# Patient Record
Sex: FEMALE | Race: WHITE | NOT HISPANIC OR LATINO | Employment: STUDENT | URBAN - METROPOLITAN AREA
[De-identification: names, ages, dates, MRNs, and addresses within clinical notes are randomized per-mention and may not be internally consistent; named-entity substitution may affect disease eponyms.]

---

## 2017-01-05 ENCOUNTER — OFFICE VISIT (OUTPATIENT)
Dept: OBSTETRICS AND GYNECOLOGY | Facility: CLINIC | Age: 27
End: 2017-01-05
Payer: MEDICAID

## 2017-01-05 ENCOUNTER — LAB VISIT (OUTPATIENT)
Dept: LAB | Facility: HOSPITAL | Age: 27
End: 2017-01-05
Attending: NURSE PRACTITIONER
Payer: MEDICAID

## 2017-01-05 VITALS
HEIGHT: 70 IN | WEIGHT: 167.13 LBS | SYSTOLIC BLOOD PRESSURE: 122 MMHG | BODY MASS INDEX: 23.93 KG/M2 | DIASTOLIC BLOOD PRESSURE: 76 MMHG

## 2017-01-05 DIAGNOSIS — Z34.91 ENCOUNTER FOR SUPERVISION OF NORMAL PREGNANCY IN FIRST TRIMESTER, UNSPECIFIED GRAVIDITY: ICD-10-CM

## 2017-01-05 DIAGNOSIS — Z34.91 ENCOUNTER FOR SUPERVISION OF NORMAL PREGNANCY IN FIRST TRIMESTER, UNSPECIFIED GRAVIDITY: Primary | ICD-10-CM

## 2017-01-05 LAB
ABO + RH BLD: NORMAL
BACTERIA #/AREA URNS AUTO: NORMAL /HPF
BASOPHILS # BLD AUTO: 0.02 K/UL
BASOPHILS NFR BLD: 0.3 %
BILIRUB UR QL STRIP: NEGATIVE
BLD GP AB SCN CELLS X3 SERPL QL: NORMAL
CAOX CRY UR QL COMP ASSIST: NORMAL
CLARITY UR REFRACT.AUTO: ABNORMAL
COLOR UR AUTO: YELLOW
DIFFERENTIAL METHOD: ABNORMAL
EOSINOPHIL # BLD AUTO: 0.1 K/UL
EOSINOPHIL NFR BLD: 1.9 %
ERYTHROCYTE [DISTWIDTH] IN BLOOD BY AUTOMATED COUNT: 12.6 %
GLUCOSE SERPL-MCNC: 67 MG/DL
GLUCOSE UR QL STRIP: NEGATIVE
HCT VFR BLD AUTO: 36.6 %
HGB BLD-MCNC: 13.1 G/DL
HGB UR QL STRIP: NEGATIVE
KETONES UR QL STRIP: NEGATIVE
LEUKOCYTE ESTERASE UR QL STRIP: NEGATIVE
LYMPHOCYTES # BLD AUTO: 1.8 K/UL
LYMPHOCYTES NFR BLD: 24.6 %
MCH RBC QN AUTO: 31.5 PG
MCHC RBC AUTO-ENTMCNC: 35.8 %
MCV RBC AUTO: 88 FL
MICROSCOPIC COMMENT: NORMAL
MONOCYTES # BLD AUTO: 0.5 K/UL
MONOCYTES NFR BLD: 6.3 %
NEUTROPHILS # BLD AUTO: 4.9 K/UL
NEUTROPHILS NFR BLD: 66.8 %
NITRITE UR QL STRIP: NEGATIVE
PH UR STRIP: 6 [PH] (ref 5–8)
PLATELET # BLD AUTO: 205 K/UL
PMV BLD AUTO: 11.3 FL
PROT UR QL STRIP: NEGATIVE
RBC # BLD AUTO: 4.16 M/UL
RBC #/AREA URNS AUTO: 1 /HPF (ref 0–4)
SP GR UR STRIP: 1.02 (ref 1–1.03)
SQUAMOUS #/AREA URNS AUTO: 19 /HPF
URN SPEC COLLECT METH UR: ABNORMAL
UROBILINOGEN UR STRIP-ACNC: NEGATIVE EU/DL
WBC # BLD AUTO: 7.32 K/UL
WBC #/AREA URNS AUTO: 2 /HPF (ref 0–5)

## 2017-01-05 PROCEDURE — 86703 HIV-1/HIV-2 1 RESULT ANTBDY: CPT

## 2017-01-05 PROCEDURE — 86850 RBC ANTIBODY SCREEN: CPT

## 2017-01-05 PROCEDURE — 88175 CYTOPATH C/V AUTO FLUID REDO: CPT

## 2017-01-05 PROCEDURE — 86900 BLOOD TYPING SEROLOGIC ABO: CPT

## 2017-01-05 PROCEDURE — 99999 PR PBB SHADOW E&M-EST. PATIENT-LVL III: CPT | Mod: PBBFAC,,, | Performed by: NURSE PRACTITIONER

## 2017-01-05 PROCEDURE — 86592 SYPHILIS TEST NON-TREP QUAL: CPT

## 2017-01-05 PROCEDURE — 82947 ASSAY GLUCOSE BLOOD QUANT: CPT

## 2017-01-05 PROCEDURE — 86762 RUBELLA ANTIBODY: CPT

## 2017-01-05 PROCEDURE — 85025 COMPLETE CBC W/AUTO DIFF WBC: CPT

## 2017-01-05 PROCEDURE — 87340 HEPATITIS B SURFACE AG IA: CPT

## 2017-01-05 PROCEDURE — 99203 OFFICE O/P NEW LOW 30 MIN: CPT | Mod: TH,S$PBB,, | Performed by: NURSE PRACTITIONER

## 2017-01-05 NOTE — PROGRESS NOTES
"CC: Risk assessment    Donna Kwong is a 26 y.o. female  presents for risk assessment.  LMP: Patient's last menstrual period was 10/17/2016 (approximate). BEN 2017. 11 w 3 d. Patient is a smoker and has a h/o ADHD and seizures. She is not on any daily meds. Same FOB. Last pap exam was normal, ,     Past Medical History   Diagnosis Date    ADHD (attention deficit hyperactivity disorder)     Seizures     Tobacco use disorder      Past Surgical History   Procedure Laterality Date    Tonsillectomy       Social History     Social History    Marital status: Single     Spouse name: N/A    Number of children: N/A    Years of education: N/A     Occupational History    Not on file.     Social History Main Topics    Smoking status: Current Every Day Smoker     Packs/day: 0.25    Smokeless tobacco: Never Used    Alcohol use 0.0 oz/week     0 Standard drinks or equivalent per week      Comment: occasional drinker    Drug use: No    Sexual activity: Yes     Partners: Male     Birth control/ protection: None     Other Topics Concern    Not on file     Social History Narrative     Family History   Problem Relation Age of Onset    Hypertension Father     Hepatitis Father      Hepatitis C     OB History      Para Term  AB TAB SAB Ectopic Multiple Living    2 2 2       2          Visit Vitals    /76    Ht 5' 10" (1.778 m)    Wt 75.8 kg (167 lb 1.7 oz)    LMP 10/17/2016 (Approximate)    BMI 23.98 kg/m2         ROS:  GENERAL: Denies weight gain or weight loss. Feeling well overall.   SKIN: Denies rash or lesions.   HEAD: Denies head injury or headache.   NODES: Denies enlarged lymph nodes.   CHEST: Denies chest pain or shortness of breath.   CARDIOVASCULAR: Denies palpitations or left sided chest pain.   ABDOMEN: No abdominal pain, constipation, diarrhea, nausea, vomiting or rectal bleeding.   URINARY: No frequency, dysuria, hematuria, or burning on urination.  REPRODUCTIVE: " See HPI.   BREASTS: The patient performs breast self-examination and denies pain, lumps, or nipple discharge.   HEMATOLOGIC: No easy bruisability or excessive bleeding.   MUSCULOSKELETAL: Denies joint pain or swelling.   NEUROLOGIC: Denies syncope or weakness.   PSYCHIATRIC: Denies depression, anxiety or mood swings.    PHYSICAL EXAM:  APPEARANCE: Well nourished, well developed, in no acute distress.  AFFECT: WNL, alert and oriented x 3  SKIN: No acne or hirsutism  NECK: Neck symmetric without masses or thyromegaly  NODES: No inguinal, cervical, axillary, or femoral lymph node enlargement  CHEST: Good respiratory effect  ABDOMEN: Soft.  No tenderness or masses.  No hepatosplenomegaly.  No hernias.  BREASTS: Symmetrical, no skin changes or visible lesions.  No palpable masses, nipple discharge bilaterally.  PELVIC: Normal external genitalia without lesions.  Normal hair distribution.  Adequate perineal body, normal urethral meatus.  Vagina moist and well rugated without lesions or discharge.  Cervix pink, without lesions, discharge or tenderness.  No significant cystocele or rectocele.  Bimanual exam shows uterus to be  11 weeks size, regular, mobile and nontender.  Adnexa without masses or tenderness.    EXTREMITIES: No edema.    1. Encounter for supervision of normal pregnancy in first trimester, unspecified   C. trachomatis/N. gonorrhoeae by AMP DNA Cervicovaginal    CBC auto differential    Hepatitis B surface antigen    HIV-1 and HIV-2 antibodies    Liquid-based pap smear, screening    POCT urine pregnancy    RPR    Rubella antibody, IgG    Type & Screen - Ob Profile    Urinalysis    Urine culture    US OB/GYN Procedure ([facility])    Glucose, random   PLAN:  Initial labs  Initial ultrasound and visit

## 2017-01-05 NOTE — MR AVS SNAPSHOT
[facility] hospital OB/ GYN  [address]  [place] [address]  Phone: [phone]  Fax: [phone]                  Donna Kwong   2017 2:00 PM   Office Visit    Description:  Female : 1990   Provider:  Halley Devine NP   Department:  [facility] - OB/ GYN           Reason for Visit     Possible Pregnancy           Diagnoses this Visit        Comments    Encounter for supervision of normal pregnancy in first trimester, unspecified     -  Primary            To Do List           Future Appointments        Provider Department Dept Phone    2017 3:00 PM LAB, SAME DAY [facility] [phone]    2017 7:30 AM ULTRASOUND, OB-GYN [facility] OB/ [facility] [phone]    2017 8:00 AM Cherie Silver CNM Critical access hospital OB/ [facility] [phone]    2017 8:45 AM ADDITIONS SPECIAL, ONLC Critical access hospital OB/ [facility] [phone]      Goals (5 Years of Data)     None      [facility] On Call     [facility] On Call Nurse Care Line -  Assistance  Registered nurses in the [facility] On Call Center provide clinical advisement, health education, appointment booking, and other advisory services.  Call for this free service at [phone].             Medications           Message regarding Medications     Verify the changes and/or additions to your medication regime listed below are the same as discussed with your clinician today.  If any of these changes or additions are incorrect, please notify your healthcare provider.        STOP taking these medications     lisdexamfetamine (VYVANSE) 60 MG capsule Take 1 capsule (60 mg total) by mouth every morning.           Verify that the below list of medications is an accurate representation of the medications you are currently taking.  If none reported, the list may be blank. If incorrect, please contact your healthcare provider. Carry this list with you in case of emergency.           Current Medications            Clinical Reference  "Information           Vital Signs - Last Recorded  Most recent update: 1/5/2017  2:27 PM by Mari Willett LPN    BP Ht Wt LMP BMI    122/76 5' 10" (1.778 m) 75.8 kg (167 lb 1.7 oz) 10/17/2016 (Approximate) 23.98 kg/m2      Blood Pressure          Most Recent Value    BP  122/76      Allergies as of 1/5/2017     No Known Allergies      Immunizations Administered on Date of Encounter - 1/5/2017     None      Orders Placed During Today's Visit      Normal Orders This Visit    C. trachomatis/N. gonorrhoeae by AMP DNA Cervicovaginal     Liquid-based pap smear, screening     POCT urine pregnancy     Urinalysis     Urine culture     Future Labs/Procedures Expected by Expires    CBC auto differential  1/5/2017 3/6/2018    Glucose, random  1/5/2017 3/6/2018    Hepatitis B surface antigen  1/5/2017 3/6/2018    HIV-1 and HIV-2 antibodies  1/5/2017 3/6/2018    RPR  1/5/2017 3/6/2018    Rubella antibody, IgG  1/5/2017 3/6/2018    Type & Screen - Ob Profile  1/5/2017 3/6/2018    US OB/GYN Procedure ([facility])  As directed 1/5/2018      [facility] Sign-Up     Activating your [facility] account is as easy as 1-2-3!     1) Visit [email or web address], select Sign Up Now, enter this activation code and your date of birth, then select Next.  [email or web address]  Expires: 2/19/2017  2:54 PM      2) Create a username and password to use when you visit [facility] in the future and select a security question in case you lose your password and select Next.    3) Enter your e-mail address and click Sign Up!    Additional Information  If you have questions, please e-mail [email or web address] or call [phone] to talk to our [facility] staff. Remember, [facility] is NOT to be used for urgent needs. For medical emergencies, dial 911.         Smoking Cessation     If you would like to quit smoking:   You may be eligible for free services if you are a [place] resident and started smoking cigarettes before September 1, 1988.  Call the Smoking " Cessation Trust ([facility]) toll free at (118) 865-1260 or (894) 184-5796.   Call 5-800-QUIT-NOW if you do not meet the above criteria.

## 2017-01-06 LAB
BACTERIA UR CULT: NO GROWTH
HBV SURFACE AG SERPL QL IA: NEGATIVE
HIV 1+2 AB+HIV1 P24 AG SERPL QL IA: NEGATIVE
RPR SER QL: NORMAL
RUBV IGG SER-ACNC: 13.4 IU/ML
RUBV IGG SER-IMP: REACTIVE

## 2017-01-10 LAB
C TRACH DNA SPEC QL NAA+PROBE: NEGATIVE
N GONORRHOEA DNA SPEC QL NAA+PROBE: NEGATIVE

## 2017-01-17 ENCOUNTER — PROCEDURE VISIT (OUTPATIENT)
Dept: OBSTETRICS AND GYNECOLOGY | Facility: CLINIC | Age: 27
End: 2017-01-17
Payer: MEDICAID

## 2017-01-17 ENCOUNTER — INITIAL PRENATAL (OUTPATIENT)
Dept: OBSTETRICS AND GYNECOLOGY | Facility: CLINIC | Age: 27
End: 2017-01-17
Payer: MEDICAID

## 2017-01-17 ENCOUNTER — NURSE TRIAGE (OUTPATIENT)
Dept: ADMINISTRATIVE | Facility: CLINIC | Age: 27
End: 2017-01-17

## 2017-01-17 VITALS
SYSTOLIC BLOOD PRESSURE: 116 MMHG | WEIGHT: 171.31 LBS | BODY MASS INDEX: 24.58 KG/M2 | DIASTOLIC BLOOD PRESSURE: 74 MMHG

## 2017-01-17 DIAGNOSIS — F17.200 TOBACCO USE DISORDER: ICD-10-CM

## 2017-01-17 DIAGNOSIS — F90.9 ATTENTION DEFICIT HYPERACTIVITY DISORDER (ADHD), UNSPECIFIED ADHD TYPE: ICD-10-CM

## 2017-01-17 DIAGNOSIS — G40.909 SEIZURE DISORDER: ICD-10-CM

## 2017-01-17 DIAGNOSIS — Z23 NEEDS FLU SHOT: Primary | ICD-10-CM

## 2017-01-17 DIAGNOSIS — Z34.91 ENCOUNTER FOR SUPERVISION OF NORMAL PREGNANCY IN FIRST TRIMESTER, UNSPECIFIED GRAVIDITY: ICD-10-CM

## 2017-01-17 PROCEDURE — 99212 OFFICE O/P EST SF 10 MIN: CPT | Mod: TH,S$PBB,, | Performed by: ADVANCED PRACTICE MIDWIFE

## 2017-01-17 PROCEDURE — 76801 OB US < 14 WKS SINGLE FETUS: CPT | Mod: 26,S$PBB,, | Performed by: OBSTETRICS & GYNECOLOGY

## 2017-01-17 PROCEDURE — 99999 PR PBB SHADOW E&M-EST. PATIENT-LVL II: CPT | Mod: PBBFAC,,, | Performed by: ADVANCED PRACTICE MIDWIFE

## 2017-01-17 NOTE — MR AVS SNAPSHOT
[facility] - [facility]/ [facility]  [address] [address]  Phone: [phone]  Fax: [phone]                  Donna Kwong   2017 8:00 AM   Initial Prenatal    Description:  Female : 1990   Provider:  Donna Cummins CNM   Department:  [facility] - [facility]/ [facility]           Reason for Visit     Initial Prenatal Visit           Diagnoses this Visit        Comments    Seizure disorder         Tobacco use disorder         Attention deficit hyperactivity disorder (ADHD), unspecified ADHD type                To Do List           Future Appointments        Provider Department Dept Phone    2017 9:00 AM Cherie Silver CNM [facility] - OB/ -[phone]      Goals (5 Years of Data)     None      [facility] On [facility]     [facility] On Call Nurse [facility] Line -  Assistance  Registered nurses in the [facility] Center provide clinical advisement, health education, appointment booking, and other advisory services.  Call for this free service at [phone].             Medications           Message regarding Medications     Verify the changes and/or additions to your medication regime listed below are the same as discussed with your clinician today.  If any of these changes or additions are incorrect, please notify your healthcare provider.             Verify that the below list of medications is an accurate representation of the medications you are currently taking.  If none reported, the list may be blank. If incorrect, please contact your healthcare provider. Carry this list with you in case of emergency.                Clinical Reference Information           Prenatal Vitals     Enc. Date GA Prenatal Vitals Prenatal Pulse Pain Level Urine Albumin/Glucose Edema Presentation Dilation/Effacement/Station    17 11w5d 116/74 / 77.7 kg (171 lb 4.8 oz)  / us 155 / Present  0           TW kg (0 lb)   Pregravid weight: 77.7 kg (171 lb 4.8 oz)       Vital Signs - Last Recorded  Most recent  update: 1/17/2017  8:52 AM by Willam Malik, RAJANI    BP Wt LMP BMI       116/74 77.7 kg (171 lb 4.8 oz) 10/17/2016 (Approximate) 24.58 kg/m2       Allergies as of 1/17/2017     No Known Allergies      Immunizations Administered on Date of Encounter - 1/17/2017     None      [facility] Sign-Up     Activating your [facility] account is as easy as 1-2-3!     1) Visit [email or web address], select Sign Up Now, enter this activation code and your date of birth, then select Next.  [email or web address]  Expires: 2/19/2017  2:54 PM      2) Create a username and password to use when you visit [facility] in the future and select a security question in case you lose your password and select Next.    3) Enter your e-mail address and click Sign Up!    Additional Information  If you have questions, please e-mail [email or web address] or call [phone] to talk to our [facility] staff. Remember, [facility] is NOT to be used for urgent needs. For medical emergencies, dial 911.

## 2017-01-17 NOTE — PROGRESS NOTES
Subjective:      Donna Kwong is being seen today for her first obstetrical visit.  This is not a planned pregnancy. She is at 11w5d gestation. Her obstetrical history is significant for smoker. Relationship with FOB: significant other, living together. Patient does intend to breast feed. Pregnancy history fully reviewed.    Menstrual History:  OB History      Para Term  AB TAB SAB Ectopic Multiple Living    4 2 2 0 1 0 1 0 0 2        Patient's last menstrual period was 10/17/2016 (approximate).       The following portions of the patient's history were reviewed and updated as appropriate: allergies, current medications, past family history, past medical history, past social history, past surgical history and problem list.    Review of Systems  A comprehensive review of systems was negative except for: Gastrointestinal: positive for nausea      Objective:      No exam performed today, done at risk assessment visit. .      Assessment:      Pregnancy at 11 and 5/7 weeks      Plan:      Initial labs drawn.  Prenatal vitamins.  Problem list reviewed and updated.  AFP3 discussed: requested.  Role of ultrasound in pregnancy discussed; fetal survey: results reviewed.  Amniocentesis discussed: not indicated.  Follow up in 4 weeks.  100% of 30 min visit spent on counseling and coordination of care.

## 2017-01-18 NOTE — TELEPHONE ENCOUNTER
Reason for Disposition   Caller requesting information about medication during pregnancy; adult is not ill and triager answers question    Protocols used: ST MEDICATION QUESTION CALL-A-LEILA Thompson called to say she has a headache, and wants to know what she can take for it. She is 3 months pregnant.  Recommended tylenol as OTC headache medication safe for pregnancy. Also recommended rest, and make certain well hydrated with water. No other questions or concerns.  Call back for any additional concerns, worsening symptoms.  Message to Halley Devine NP, OB. Please contact caller directly with any additional care advice.

## 2017-02-21 ENCOUNTER — LAB VISIT (OUTPATIENT)
Dept: LAB | Facility: HOSPITAL | Age: 27
End: 2017-02-21
Attending: ADVANCED PRACTICE MIDWIFE
Payer: MEDICAID

## 2017-02-21 ENCOUNTER — ROUTINE PRENATAL (OUTPATIENT)
Dept: OBSTETRICS AND GYNECOLOGY | Facility: CLINIC | Age: 27
End: 2017-02-21
Payer: MEDICAID

## 2017-02-21 VITALS
WEIGHT: 176.38 LBS | BODY MASS INDEX: 25.31 KG/M2 | DIASTOLIC BLOOD PRESSURE: 70 MMHG | SYSTOLIC BLOOD PRESSURE: 120 MMHG

## 2017-02-21 DIAGNOSIS — Z34.80 SUPERVISION OF OTHER NORMAL PREGNANCY: ICD-10-CM

## 2017-02-21 DIAGNOSIS — Z34.80 SUPERVISION OF OTHER NORMAL PREGNANCY: Primary | ICD-10-CM

## 2017-02-21 DIAGNOSIS — Z36.89 ENCOUNTER FOR FETAL ANATOMIC SURVEY: ICD-10-CM

## 2017-02-21 PROCEDURE — 36415 COLL VENOUS BLD VENIPUNCTURE: CPT

## 2017-02-21 PROCEDURE — 99213 OFFICE O/P EST LOW 20 MIN: CPT | Mod: TH,S$PBB,, | Performed by: ADVANCED PRACTICE MIDWIFE

## 2017-02-21 PROCEDURE — 99999 PR PBB SHADOW E&M-EST. PATIENT-LVL II: CPT | Mod: PBBFAC,,, | Performed by: ADVANCED PRACTICE MIDWIFE

## 2017-02-21 PROCEDURE — 81511 FTL CGEN ABNOR FOUR ANAL: CPT

## 2017-02-21 NOTE — MR AVS SNAPSHOT
[facility]  [address]  [place] [address]  Phone: [phone]  Fax: [phone]                  Donna Kwong   2017 9:00 AM   Routine Prenatal    Description:  Female : 1990   Provider:  Cherie Silver CNM   Department:  [facility]/ [facility]           Reason for Visit     Routine Prenatal Visit                To Do List           Future Appointments        Provider Department Dept Phone    3/21/2017 9:30 AM ULTRASOUND, OB-GYN [facility] - [facility]/ -[phone]    3/21/2017 10:00 AM FREDDIE Dukes - OB/ -[phone]      Goals (5 Years of Data)     None      [facility]     [facility] On [facility] Nurse Care [facility] -  Assistance  Registered nurses in the [facility] Center provide clinical advisement, health education, appointment booking, and other advisory services.  Call for this free service at [phone].             Medications           Message regarding Medications     Verify the changes and/or additions to your medication regime listed below are the same as discussed with your clinician today.  If any of these changes or additions are incorrect, please notify your healthcare provider.             Verify that the below list of medications is an accurate representation of the medications you are currently taking.  If none reported, the list may be blank. If incorrect, please contact your healthcare provider. Carry this list with you in case of emergency.                Clinical Reference Information           Prenatal Vitals     Enc. Date GA Prenatal Vitals Prenatal Pulse Pain Level Urine Albumin/Glucose Edema Presentation Dilation/Effacement/Station    17 16w5d 120/70 / 80 kg (176 lb 5.9 oz)  / 147 / Present   Negative / Negative       17 11w5d 116/74 / 77.7 kg (171 lb 4.8 oz)  / us 155 / Present  0           TW.3 kg (5 lb 1.1 oz)   Pregravid weight: 77.7 kg (171 lb 4.8 oz)       Your Vitals Were     BP Weight Last Period BMI        120/70 80 kg (176 lb 5.9 oz) 10/17/2016 (Approximate) 25.31 kg/m2       Allergies as of 2/21/2017     No Known Allergies      Immunizations Administered on Date of Encounter - 2/21/2017     None      [facility] Sign-Up     Activating your [facility] account is as easy as 1-2-3!     1) Visit [email or web address], select Sign Up Now, enter this activation code and your date of birth, then select Next.  [email or web address]  Expires: 4/7/2017  9:04 AM      2) Create a username and password to use when you visit [facility] in the future and select a security question in case you lose your password and select Next.    3) Enter your e-mail address and click Sign Up!    Additional Information  If you have questions, please e-mail [email or web address] or call [phone] to talk to our [facility] staff. Remember, [facility] is NOT to be used for urgent needs. For medical emergencies, dial 911.         Language Assistance Services     ATTENTION: Language assistance services are available, free of charge. Please call [phone].      ATENCIÓN: Si habla twanañol, tiene a mcnally disposición servicios gratuitos de asistencia lingüística. Llame al [phone].     CHÚ Ý: N?u b?n nói Ti?ng Vi?t, có các d?ch v? h? tr? ngôn ng? mi?n phí dành cho b?n. G?i s? [phone].         [facility] complies with applicable Federal civil rights laws and does not discriminate on the basis of race, color, national origin, age, disability, or sex.

## 2017-02-21 NOTE — PROGRESS NOTES
Feeling well today. + quickening. Anatomy scan next visit. Sister is planning gender reveal, do not tell pt gender of baby. Quad screen today. Prenatal vitamin Rx sent to pt's pharmacy. RTC 4wks.  Coffective counseling sheet Build Your Team discussed with mother. Reinforced importance of early identification of support team including champion, OB provider, pediatrician and local community resources. Encouraged mother to download Coffective mobile justine if she has not already done so.  Mother verbalizes understanding.

## 2017-02-23 LAB
ALPHA FETOPROTEIN MATERNAL: 27.4 NG/ML
DOWN RISK (<1:270): NORMAL
ETHNIC ORIGIN: NORMAL
GA METHOD: NORMAL
GESTATIONAL AGE (DAYS): 5
GESTATIONAL AGE (WEEKS): 16
HUMAN CHORIONIC GONADOTROPIN: 11.5 IU/ML
INHIBIN A: 51.7 PG/ML
INSULIN DEPEND. DIABETES: NORMAL
M.O.M. ALPHA FETOPROTEIN: 0.84
M.O.M. HCG: 0.42
M.O.M. INHIBIN A: 0.33
M.O.M. UNCONJ. ESTRIOL: 0.78
MATERNAL AGE AT EDD (YRS): 24
MATERNAL AGE FOR DOWN: NORMAL
MATERNAL WEIGHT (LBS): 176
MULTIPLE GESTATIONS: NORMAL
QUAD SCREEN INTERPRETATION: NORMAL
QUAD SCREEN: NEGATIVE
TRISOMY 18 (<1:100): NORMAL
UNCONJUGATED ESTRIOL: 0.75 NG/ML

## 2017-02-27 ENCOUNTER — OFFICE VISIT (OUTPATIENT)
Dept: OTOLARYNGOLOGY | Facility: CLINIC | Age: 27
End: 2017-02-27
Payer: MEDICAID

## 2017-02-27 ENCOUNTER — TELEPHONE (OUTPATIENT)
Dept: INTERNAL MEDICINE | Facility: CLINIC | Age: 27
End: 2017-02-27

## 2017-02-27 VITALS
BODY MASS INDEX: 25.75 KG/M2 | SYSTOLIC BLOOD PRESSURE: 105 MMHG | WEIGHT: 179.44 LBS | DIASTOLIC BLOOD PRESSURE: 67 MMHG | HEART RATE: 76 BPM | TEMPERATURE: 98 F

## 2017-02-27 DIAGNOSIS — S02.2XXA CLOSED FRACTURE OF NASAL BONE, INITIAL ENCOUNTER: Primary | ICD-10-CM

## 2017-02-27 DIAGNOSIS — S02.2XXD CLOSED FRACTURE OF NASAL BONE WITH ROUTINE HEALING, SUBSEQUENT ENCOUNTER: Primary | ICD-10-CM

## 2017-02-27 PROCEDURE — 99203 OFFICE O/P NEW LOW 30 MIN: CPT | Mod: S$PBB,,, | Performed by: PHYSICIAN ASSISTANT

## 2017-02-27 PROCEDURE — 99999 PR PBB SHADOW E&M-EST. PATIENT-LVL III: CPT | Mod: PBBFAC,,, | Performed by: PHYSICIAN ASSISTANT

## 2017-02-27 PROCEDURE — 99213 OFFICE O/P EST LOW 20 MIN: CPT | Mod: PBBFAC | Performed by: PHYSICIAN ASSISTANT

## 2017-02-27 NOTE — TELEPHONE ENCOUNTER
----- Message from Nissa Acharya sent at 2/27/2017  8:36 AM CST -----  Contact: Pt  Pt request call from nurse to get a referral to see a ENT, please contact pt at [phone]

## 2017-02-27 NOTE — PROGRESS NOTES
Referring Provider:    Marii Aguilar Md  [address] [place] [address]  Subjective:   Patient: Donna Kwong [id number], :1990   Visit date:2017 4:38 PM    Chief Complaint:  Consult (nasal fx)    HPI:  Donna is a 26 y.o. female who I was asked to see in consultation for evaluation of the following issue(s):  Closed nasal fracture.  Patient is 19 weeks pregnant.  She was cutting wood on Saturday and states it came up striking her in the nose on 17.  She had immediate pain; denies epistaxis; denies LOC.  She presented to [facility] in [place] where imaging was not done because she's 19 weeks pregnant.  She reports initially having difficulty breathing thru her left nostril but that has improved since yesterday.  She does have bruising beneath her left eye and some swelling.  She has not been using any nasal sprays.  She had a headache yesterday, resolved today.  Denies ear pain or drainage.  Her left eye has had more tears than normal but she related it to the bruising and swelling.  She denies any difficulty with her sense of smell.  She does think her nose looks a little deviated to the right but says it's better today as the swelling's going down.      Review of Systems:  Negative unless checked off.  Gen:  []fever   []fatigue  HENT:  []nosebleeds  []dental problem   Eyes:  []photophobia  []visual disturbance  Resp:  []chest tightness []wheezing  Card:  []chest pain  []leg swelling  GI:  []abdominal pain []blood in stool  :  []dysuria  []hematuria  Musc:  []joint swelling  []gait problem  Skin:  []color change  []pallor  Neuro:  [x]seizures - last in October, no meds  []numbness  Hem:  []bruise/bleed easily  Psych:  []hallucinations  []behavioral problems  Allergy/Imm: has No Known Allergies.    Her meds, allergies, medical, surgical, social & family histories were reviewed & updated:  -     She has a current medication list which includes the following  prescription(s): prenatal vit#[id number]-iron-fa.  -     She  has a past medical history of ADHD (attention deficit hyperactivity disorder); Seizures; and Tobacco use disorder.   -     She  does not have any pertinent problems on file.   -     She  has a past surgical history that includes Tonsillectomy.  -     She  reports that she has quit smoking. She smoked 0.00 packs per day. She quit smokeless tobacco use about 6 weeks ago. She reports that she does not drink alcohol or use illicit drugs.  -     Her family history includes Hepatitis in her father; Hypertension in her father.  -     She has No Known Allergies.    Objective:     Physical Exam:  Vitals:  /67  Pulse 76  Temp 98.1 °F (36.7 °C) (Tympanic)   Wt 81.4 kg (179 lb 7.3 oz)  LMP 10/17/2016 (Approximate)  BMI 25.75 kg/m2  General appearance:  Well developed, well nourished    Eyes:  Extraocular motions intact, PERRL.  Ecchymosis left upper eyelid and infraorbital.    Communication:  no hoarseness, no dysphonia    Ears:  Otoscopy of external auditory canals and tympanic membranes was normal, clinical speech reception thresholds grossly intact, no mass/lesion of auricle.  Nose:  No stepoff palpated along nasal bridge; mild tenderness with palpation.  Edema of L>R lateral nasal wall; no septal hematoma visualized.  No active bleeding.  Mild right septal deviation.          Mouth:  No mass/lesion of lips, teeth, gums, hard/soft palate, tongue, tonsils, or oropharynx.    Cardiovascular:   Radial Pulses +2     Neck & Lymphatics:  No cervical lymphadenopathy, no neck mass/crepitus/ asymmetry, trachea is midline, no thyroid enlargement/tenderness/mass.    Psych: Oriented x3,  Alert with normal mood and affect.     Respiration/Chest:  Symmetric expansion during respiration, normal respiratory effort.    Skin:  Warm and intact. No ulcerations of face, scalp, neck.    Assessment & Plan:   Donna was seen today for consult.    Diagnoses and all orders for this  visit:    Closed fracture of nasal bone with routine healing, subsequent encounter      Discussed continued observation.  If she continues to feel as though she has left sided obstruction that persists as the swelling improves, she's to call the clinic for imaging and followup with Dr. Marvin.      We discussed her medical conditions, treatments and plan.  Donna should return to clinic if any issues arise (symptoms worsen or persist), otherwise we will see her back in the clinic only as needed.    Thank you for allowing me to participate in the care of Donna.

## 2017-02-27 NOTE — LETTER
February 27, 2017      Marii Aguilar MD  [address] [place] [address] [address] [place] [address]           [facility]  [address] [place] [address] [place] [address]  Phone: [phone]  Fax: [phone]          Patient: Donna Kwong   MR Number: [id number]   YOB: 1990   Date of Visit: 2/27/2017       Dear Dr. Marii Aguilar:    Thank you for referring Donna Kwong to me for evaluation. Attached you will find relevant portions of my assessment and plan of care.    If you have questions, please do not hesitate to call me. I look forward to following Donna Kwong along with you.    Sincerely,    Donna Uribe PA-C    Enclosure  CC:  No Recipients    If you would like to receive this communication electronically, please contact [email or web address] or [phone] to request more information on [employer] access.    For providers and/or their staff who would like to refer a patient to [facility], please contact us through our one-stop-shop provider referral line, [facility], at [phone].    If you feel you have received this communication in error or would no longer like to receive these types of communications, please e-mail [email or web address]

## 2017-03-21 ENCOUNTER — PROCEDURE VISIT (OUTPATIENT)
Dept: OBSTETRICS AND GYNECOLOGY | Facility: CLINIC | Age: 27
End: 2017-03-21
Payer: MEDICAID

## 2017-03-21 ENCOUNTER — ROUTINE PRENATAL (OUTPATIENT)
Dept: OBSTETRICS AND GYNECOLOGY | Facility: CLINIC | Age: 27
End: 2017-03-21
Payer: MEDICAID

## 2017-03-21 VITALS — DIASTOLIC BLOOD PRESSURE: 62 MMHG | BODY MASS INDEX: 26.26 KG/M2 | WEIGHT: 183 LBS | SYSTOLIC BLOOD PRESSURE: 104 MMHG

## 2017-03-21 DIAGNOSIS — Z34.80 SUPERVISION OF OTHER NORMAL PREGNANCY: Primary | ICD-10-CM

## 2017-03-21 DIAGNOSIS — G40.909 SEIZURE DISORDER: ICD-10-CM

## 2017-03-21 DIAGNOSIS — F17.200 TOBACCO USE DISORDER: ICD-10-CM

## 2017-03-21 DIAGNOSIS — Z36.89 ENCOUNTER FOR FETAL ANATOMIC SURVEY: ICD-10-CM

## 2017-03-21 PROCEDURE — 99212 OFFICE O/P EST SF 10 MIN: CPT | Mod: PBBFAC,25 | Performed by: ADVANCED PRACTICE MIDWIFE

## 2017-03-21 PROCEDURE — 99213 OFFICE O/P EST LOW 20 MIN: CPT | Mod: TH,S$PBB,, | Performed by: ADVANCED PRACTICE MIDWIFE

## 2017-03-21 PROCEDURE — 76805 OB US >/= 14 WKS SNGL FETUS: CPT | Mod: PBBFAC | Performed by: OBSTETRICS & GYNECOLOGY

## 2017-03-21 PROCEDURE — 76805 OB US >/= 14 WKS SNGL FETUS: CPT | Mod: 26,S$PBB,, | Performed by: OBSTETRICS & GYNECOLOGY

## 2017-03-21 PROCEDURE — 99999 PR PBB SHADOW E&M-EST. PATIENT-LVL II: CPT | Mod: PBBFAC,,, | Performed by: ADVANCED PRACTICE MIDWIFE

## 2017-03-21 NOTE — PROGRESS NOTES
US today, it's a boy!! Normal anatomy. C/o pain in lower abd, pelvic region, ache, worsens with movement. Discussed round ligament pain, comfort measures. Planning to breastfeed. Coffective counseling sheet Build Your Team discussed with mother. Reinforced importance of early identification of support team including champion, OB provider, pediatrician and local community resources. Encouraged mother to download Coffective mobile justine if she has not already done so.  Mother verbalizes understanding. Coffective counseling sheet Get Ready discussed with mother. Reinforced avoiding induction of labor unless medically indicated as well as comfort measures during labor.  Encouraged mother to download Coffective mobile justine if she has not already done so. Mother verbalizes understanding. al

## 2017-04-07 ENCOUNTER — HOSPITAL ENCOUNTER (OUTPATIENT)
Facility: HOSPITAL | Age: 27
Discharge: HOME | End: 2017-04-07
Attending: OBSTETRICS & GYNECOLOGY | Admitting: OBSTETRICS & GYNECOLOGY
Payer: COMMERCIAL

## 2017-04-07 VITALS
HEART RATE: 80 BPM | RESPIRATION RATE: 18 BRPM | SYSTOLIC BLOOD PRESSURE: 110 MMHG | DIASTOLIC BLOOD PRESSURE: 61 MMHG | TEMPERATURE: 98 F
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
## 2017-04-07 PROCEDURE — 99211 OFF/OP EST MAY X REQ PHY/QHP: CPT

## 2017-04-07 PROCEDURE — 99213 OFFICE O/P EST LOW 20 MIN: CPT | Mod: TH,S$PBB,, | Performed by: OBSTETRICS & GYNECOLOGY

## 2017-04-07 PROCEDURE — G0378 HOSPITAL OBSERVATION PER HR: HCPCS
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
## 2017-04-07 NOTE — IP AVS SNAPSHOT
[facility]  [id number] [place] [address] [address] [place] [address]           Patient Discharge Instructions   Our goal is to set you up for success. This packet includes information on your condition, medications, and your home care.  It will help you care for yourself to prevent having to return to the hospital.     Please ask your nurse if you have any questions.      There are many details to remember when preparing to leave the hospital. Here is what you will need to do:    1. Take your medicine. If you are prescribed medications, review your Medication List on the following pages. You may have new medications to  at the pharmacy and others that you'll need to stop taking. Review the instructions for how and when to take your medications. Talk with your doctor or nurses if you are unsure of what to do.     2. Go to your follow-up appointments. Specific follow-up information is listed in the following pages. Your may be contacted by a nurse or clinical provider about future appointments. Be sure we have all of the phone numbers to reach you. Please contact your provider's office if you are unable to make an appointment.     3. Watch for warning signs. Your doctor or nurse will give you detailed warning signs to watch for and when to call for assistance. These instructions may also include educational information about your condition. If you experience any of warning signs to your health, call your doctor.           [facility]  Unless otherwise directed by your provider, please   contact [facility], our nurse care line   that is available for 24/7 assistance.     [phone] (toll-free)     Registered nurses in the [facility]   provide: appointment scheduling, clinical advisement, health education, and other advisory services.                  ** Verify the list of medication(s) below is accurate and up to date. Carry this with you in case of emergency. If your  medications have changed, please notify your healthcare provider.             Medication List      CONTINUE taking these medications        Additional Info                      prenatal vit#103-iron-FA 27 mg iron- 1 mg Tab   Commonly known as:     Quantity:  30 tablet   Refills:  11   Dose:  1 tablet    Instructions:  Take 1 tablet by mouth once daily.     Begin Date    AM    Noon    PM    Bedtime                  Please bring to all follow up appointments:    1. A copy of your discharge instructions.  2. All medicines you are currently taking in their original bottles.  3. Identification and insurance card.    Please arrive 15 minutes ahead of scheduled appointment time.    Please call 24 hours in advance if you must reschedule your appointment and/or time.        Your Scheduled Appointments     2017  7:45 AM CDT   Routine Prenatal Visit with FREDDIE Dukes - OB/ GYN ([facility])    [address] [facility] [address] [phone]              Follow-up Information     Follow up On 2017.    Why:  As needed, If symptoms worsen        Discharge Instructions     Future Orders    Diet general     Questions:    Total calories:      Fat restriction, if any:      Protein restriction, if any:      Na restriction, if any:      Fluid restriction:      Additional restrictions:          Discharge Instructions        Discharge Instructions    Self Care Instructions:    Diet:  · Eat from the five basic food groups  · Fruits and proteins are good choices  · Limit fast foods and added salt/sugar  · Moderate carbonated and caffeine drinks    Hydration:  · Drink at least 8 large glasses of water a day    Kick Counts:  · After a meal, rest on your side and note the baby's movements until you have 8-10 movements in a 2 hour counting period.    · If you do not feel your baby move 8-10 times within 2 hours or you sense a change in the type or character of the baby's movement,  you should come in to the hospital at once.  · Remember; your baby can sleep for 20-40 minutes at a time.      When to notify your provider:    · Vaginal bleeding like a period;  You may spot if we examined your cervix.  · If your water breaks, come to the birth center.  Note time, color and odor.  · Abdominal tenderness or pain that does not go away  · Contractions every 3 to 5 minutes for 1 to 2 hours.  True contractions move from front to back, are regular; usually get longer, stronger and closer together and do not stop if you change your position or activity.  · Any burning, urgency or frequency in relation to emptying your bladder.  · Any temperature greater than 100.4 degrees, chills, flu-like symptoms            Admission Information     Date & Time Provider Department CSN    4/7/2017 10:56 AM Jyoti Yanes MD [facility] -  [phone]      Care Providers     Provider Role Specialty Primary office phone    Jyoti Yanes MD Attending Provider Obstetrics [phone]      Your Vitals Were     BP Pulse Temp Resp Last Period       110/61 80 97.9 °F (36.6 °C) (Oral) 18 10/17/2016 (Approximate)       Recent Lab Values     No lab values to display.      Allergies as of 4/7/2017     No Known Allergies      Advance Directives     An advance directive is a document which, in the event you are no longer able to make decisions for yourself, tells your healthcare team what kind of treatment you do or do not want to receive, or who you would like to make those decisions for you.  If you do not currently have an advance directive, [facility] encourages you to create one.  For more information call:  [phone]), [phone] ([phone]),  or log on to [email or web address].        Smoking Cessation     If you would like to quit smoking:   You may be eligible for free services if you are a [place] resident and started smoking cigarettes before September 1, 1988.  Call the  Smoking Cessation Trust ([facility]) toll free at (659) [phone] or (953) 437-3794.   Call 1-800-QUIT-NOW if you do not meet the above criteria.   Contact us via email: [email or web address]   View our website for more information: [email or web address]        Language Assistance Services     ATTENTION: Language assistance services are available, free of charge. Please call [phone].      ATENCIÓN: Si habla español, tiene a mcnally disposición servicios gratuitos de asistencia lingüística. Llame al [phone].     CHÚ Ý: N?u b?n nói Ti?ng Vi?t, có các d?ch v? h? tr? ngôn ng? mi?n phí dành cho b?n. G?i s? [phone].        [facility] Sign-Up     Activating your [facility] account is as easy as 1-2-3!     1) Visit [email or web address], select Sign Up Now, enter this activation code and your date of birth, then select Next.  [email or web address]  Expires: 5/22/2017 12:21 PM      2) Create a username and password to use when you visit [facility] in the future and select a security question in case you lose your password and select Next.    3) Enter your e-mail address and click Sign Up!    Additional Information  If you have questions, please e-mail [email or web address] or call [phone] to talk to our [facility] staff. Remember, [facility] is NOT to be used for urgent needs. For medical emergencies, dial 911.          [facility] complies with applicable Federal civil rights laws and does not discriminate on the basis of race, color, national origin, age, disability, or sex.
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
## 2017-04-24 ENCOUNTER — ROUTINE PRENATAL (OUTPATIENT)
Dept: OBSTETRICS AND GYNECOLOGY | Facility: CLINIC | Age: 27
End: 2017-04-24
Payer: MEDICAID

## 2017-04-24 VITALS
SYSTOLIC BLOOD PRESSURE: 102 MMHG | WEIGHT: 191.13 LBS | BODY MASS INDEX: 27.43 KG/M2 | DIASTOLIC BLOOD PRESSURE: 82 MMHG

## 2017-04-24 DIAGNOSIS — Z34.80 SUPERVISION OF OTHER NORMAL PREGNANCY: Primary | ICD-10-CM

## 2017-04-24 DIAGNOSIS — F17.200 TOBACCO USE DISORDER: ICD-10-CM

## 2017-04-24 PROCEDURE — 99213 OFFICE O/P EST LOW 20 MIN: CPT | Mod: TH,S$PBB,, | Performed by: ADVANCED PRACTICE MIDWIFE

## 2017-04-24 PROCEDURE — 99999 PR PBB SHADOW E&M-EST. PATIENT-LVL II: CPT | Mod: PBBFAC,,, | Performed by: ADVANCED PRACTICE MIDWIFE

## 2017-04-24 PROCEDURE — 99212 OFFICE O/P EST SF 10 MIN: CPT | Mod: PBBFAC | Performed by: ADVANCED PRACTICE MIDWIFE

## 2017-04-24 NOTE — MR AVS SNAPSHOT
[facility]  [address] [facility]  [place] [address]  Phone: [phone]  Fax: [phone]                  Donna Kwong   2017 7:45 AM   Routine Prenatal    Description:  Female : 1990   Provider:  Cherie Silver CNM   Department:  [facility]           Reason for Visit     Routine Prenatal Visit           Diagnoses this Visit        Comments    Supervision of other normal pregnancy    -  Primary            To Do List           Future Appointments        Provider Department Dept Phone    2017 7:45 AM Cherie Silver CNM [facility] OB/ -[phone]    2017 10:50 AM LABORATORY, [address] [facility] [phone]      Goals (5 Years of Data)     None      [facility]     [facility] Nurse Care Line -  Assistance  Unless otherwise directed by your provider, please contact [facility], our nurse care line that is available for  assistance.     Registered nurses in the [facility] provide: appointment scheduling, clinical advisement, health education, and other advisory services.  Call: [phone] (toll free)               Medications           Message regarding Medications     Verify the changes and/or additions to your medication regime listed below are the same as discussed with your clinician today.  If any of these changes or additions are incorrect, please notify your healthcare provider.             Verify that the below list of medications is an accurate representation of the medications you are currently taking.  If none reported, the list may be blank. If incorrect, please contact your healthcare provider. Carry this list with you in case of emergency.           Current Medications     prenatal vit#103-iron-FA () 27 mg iron- 1 mg Tab Take 1 tablet by mouth once daily.           Clinical Reference Information           Prenatal Vitals     Enc. Date GA Prenatal Vitals Prenatal Pulse Pain Level Urine  Albumin/Glucose Edema Presentation Dilation/Effacement/Station    17 25w4d 102/82 / 86.7 kg (191 lb 2.2 oz)  / 143    None / None / None / No      17 23w1d Admission Dept: [facility] L&D    3/21/17 20w5d 104/62 / 83 kg (182 lb 15.7 oz)  / us 153 / Present    None / None / None / No      17 16w5d 120/70 / 80 kg (176 lb 5.9 oz)  / 147 / Present   Negative / Negative       17 11w5d 116/74 / 77.7 kg (171 lb 4.8 oz)  / us 155 / Present  0           TW kg (19 lb 13.5 oz)   Pregravid weight: 77.7 kg (171 lb 4.8 oz)       Your Vitals Were     BP Weight Last Period BMI       102/82 86.7 kg (191 lb 2.2 oz) 10/17/2016 (Approximate) 27.43 kg/m2       Allergies as of 2017     No Known Allergies      Immunizations Administered on Date of Encounter - 2017     None      Orders Placed During Today's Visit     Future Labs/Procedures Expected by Expires    CBC auto differential  2017    HIV-1 and HIV-2 antibodies  2017    OB Glucose Screen  2017    RPR  2017      [facility] Sign-Up     Activating your [facility] account is as easy as 1-2-3!     1) Visit [email or web address], select Sign Up Now, enter this activation code and your date of birth, then select Next.  [email or web address]  Expires: 2017 12:21 PM      2) Create a username and password to use when you visit [facility] in the future and select a security question in case you lose your password and select Next.    3) Enter your e-mail address and click Sign Up!    Additional Information  If you have questions, please e-mail [email or web address] or call [phone] to talk to our [facility] staff. Remember, [facility] is NOT to be used for urgent needs. For medical emergencies, dial 911.         Language Assistance Services     ATTENTION: Language assistance services are available, free of charge. Please call [phone].      ATENCIÓN: Si desmond jenkins, tiene a mcnally disposición servicios gratuitos  de asistencia lingüística. Nanci uriostegui [phone].     JOSH Ý: N?u b?n nói Ti?ng Vi?t, có các d?ch v? h? tr? ngôn ng? mi?n phí dành cho b?n. G?i s? [phone].         [facility] complies with applicable Federal civil rights laws and does not discriminate on the basis of race, color, national origin, age, disability, or sex.
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
## 2017-05-04 ENCOUNTER — HOSPITAL ENCOUNTER (OUTPATIENT)
Facility: HOSPITAL | Age: 27
Discharge: HOME | End: 2017-05-05
Attending: OBSTETRICS & GYNECOLOGY | Admitting: OBSTETRICS & GYNECOLOGY
Payer: MEDICAID

## 2017-05-04 VITALS
HEART RATE: 89 BPM | SYSTOLIC BLOOD PRESSURE: 114 MMHG | DIASTOLIC BLOOD PRESSURE: 67 MMHG | RESPIRATION RATE: 18 BRPM | TEMPERATURE: 98 F

## 2017-05-04 DIAGNOSIS — R10.2 PAIN OF ROUND LIGAMENT COMPLICATING PREGNANCY, ANTEPARTUM: ICD-10-CM

## 2017-05-04 DIAGNOSIS — N94.9 ROUND LIGAMENT PAIN: ICD-10-CM

## 2017-05-04 DIAGNOSIS — O26.899 PAIN OF ROUND LIGAMENT COMPLICATING PREGNANCY, ANTEPARTUM: ICD-10-CM

## 2017-05-04 PROCEDURE — 99211 OFF/OP EST MAY X REQ PHY/QHP: CPT

## 2017-05-04 PROCEDURE — 99213 OFFICE O/P EST LOW 20 MIN: CPT | Mod: 25,TH,, | Performed by: ADVANCED PRACTICE MIDWIFE

## 2017-05-04 PROCEDURE — 59025 FETAL NON-STRESS TEST: CPT | Mod: 26,,, | Performed by: ADVANCED PRACTICE MIDWIFE

## 2017-05-04 RX ORDER — ONDANSETRON 8 MG/1
8 TABLET, ORALLY DISINTEGRATING ORAL EVERY 8 HOURS PRN
Status: DISCONTINUED | OUTPATIENT
Start: 2017-05-05 | End: 2017-05-05 | Stop reason: HOSPADM

## 2017-05-04 RX ORDER — ACETAMINOPHEN 500 MG
500 TABLET ORAL EVERY 6 HOURS PRN
Status: DISCONTINUED | OUTPATIENT
Start: 2017-05-05 | End: 2017-05-05 | Stop reason: HOSPADM

## 2017-05-04 NOTE — IP AVS SNAPSHOT
[facility]  [id number] [place] [address] [address] Rolanda [place]DSON [id number]           Patient Discharge Instructions   Our goal is to set you up for success. This packet includes information on your condition, medications, and your home care.  It will help you care for yourself to prevent having to return to the hospital.     Please ask your nurse if you have any questions.      There are many details to remember when preparing to leave the hospital. Here is what you will need to do:    1. Take your medicine. If you are prescribed medications, review your Medication List on the following pages. You may have new medications to  at the pharmacy and others that you'll need to stop taking. Review the instructions for how and when to take your medications. Talk with your doctor or nurses if you are unsure of what to do.     2. Go to your follow-up appointments. Specific follow-up information is listed in the following pages. Your may be contacted by a nurse or clinical provider about future appointments. Be sure we have all of the phone numbers to reach you. Please contact your provider's office if you are unable to make an appointment.     3. Watch for warning signs. Your doctor or nurse will give you detailed warning signs to watch for and when to call for assistance. These instructions may also include educational information about your condition. If you experience any of warning signs to your health, call your doctor.           [facility]  Unless otherwise directed by your provider, please   contact [facility], our nurse care line   that is available for 24/7 assistance.     [phone] (toll-free)     Registered nurses in the [facility]   provide: appointment scheduling, clinical advisement, health education, and other advisory services.                  ** Verify the list of medication(s) below is accurate and up to date. Carry this with you in case of emergency. If your  medications have changed, please notify your healthcare provider.             Medication List      ASK your doctor about these medications        Additional Info                      prenatal vit#103-iron-FA 27 mg iron- 1 mg Tab   Commonly known as:     Quantity:  30 tablet   Refills:  11   Dose:  1 tablet    Instructions:  Take 1 tablet by mouth once daily.     Begin Date    AM    Noon    PM    Bedtime                  Please bring to all follow up appointments:    1. A copy of your discharge instructions.  2. All medicines you are currently taking in their original bottles.  3. Identification and insurance card.    Please arrive 15 minutes ahead of scheduled appointment time.    Please call 24 hours in advance if you must reschedule your appointment and/or time.        Your Scheduled Appointments     May 22, 2017  7:45 AM CDT   Routine Prenatal Visit with FREDDIE Dukes - OB/ GYN ([facility])    [address] [place] [id number]   [phone]            May 22, 2017 10:50 AM CDT   Non-Fasting Lab with LABORATORY, KAY CUNNINGHAM   [facility]    [address] [place] [id number]   [phone]                  Discharge Instructions        Discharge Instructions    Self Care Instructions:    Diet:  · Eat from the five basic food groups  · Fruits and proteins are good choices  · Limit fast foods and added salt/sugar  · Moderate carbonated and caffeine drinks    Hydration:  · Drink at least 8 large glasses of water a day    Kick Counts:  · After a meal, rest on your side and note the baby's movements until you have 8-10 movements in a 2 hour counting period.    · If you do not feel your baby move 8-10 times within 2 hours or you sense a change in the type or character of the baby's movement, you should come in to the hospital at once.  · Remember; your baby can sleep for 20-40 minutes at a time.      When to notify  your provider:    · Vaginal bleeding like a period;  You may spot if we examined your cervix.  · If your water breaks, come to the birth center.  Note time, color and odor.  · Abdominal tenderness or pain that does not go away  · Contractions every 3 to 5 minutes for 1 to 2 hours.  True contractions move from front to back, are regular; usually get longer, stronger and closer together and do not stop if you change your position or activity.  · Any burning, urgency or frequency in relation to emptying your bladder.  · Any temperature greater than 100.4 degrees, chills, flu-like symptoms          Admission Information     Date & Time Provider Department CSN    5/4/2017 10:38 PM Jyoti Yanes MD [facility] [id number]      Care Providers     Provider Role Specialty Primary office phone    Jyoti Yanes MD Attending Provider Obstetrics [phone]      Your Vitals Were     Last Period                   10/17/2016 (Approximate)           Recent Lab Values     No lab values to display.      Allergies as of 5/4/2017     No Known Allergies      Advance Directives     An advance directive is a document which, in the event you are no longer able to make decisions for yourself, tells your healthcare team what kind of treatment you do or do not want to receive, or who you would like to make those decisions for you.  If you do not currently have an advance directive, [facility] encourages you to create one.  For more information call:  [phone]), [phone] ([phone]),  or log on to [email or web address].        Smoking Cessation     If you would like to quit smoking:   You may be eligible for free services if you are a [place] resident and started smoking cigarettes before September 1, 1988.  Call the Smoking Cessation Trust (SCT) toll free at (768) 143-0850 or (466) 250-3649.   Call 1-800-QUIT-NOW if you do not meet the above criteria.   Contact us via email:  [email or web address]   View our website for more information: [email or web address]        Language Assistance Services     ATTENTION: Language assistance services are available, free of charge. Please call [phone].      ATENCIÓN: Si desmond jenkins, tiene a mcnally disposición servicios gratuitos de asistencia lingüística. Llame al [phone].     CHÚ Ý: N?u b?n nói Ti?ng Vi?t, có các d?ch v? h? tr? ngôn ng? mi?n phí dành cho b?n. G?i s? [phone].        [facility] Sign-Up     Activating your [facility] account is as easy as 1-2-3!     1) Visit [email or web address], select Sign Up Now, enter this activation code and your date of birth, then select Next.  [email or web address]  Expires: 5/22/2017 12:21 PM      2) Create a username and password to use when you visit [facility] in the future and select a security question in case you lose your password and select Next.    3) Enter your e-mail address and click Sign Up!    Additional Information  If you have questions, please e-mail [email or web address] or call [phone] to talk to our [facility] staff. Remember, [facility] is NOT to be used for urgent needs. For medical emergencies, dial 911.          [facility] complies with applicable Federal civil rights laws and does not discriminate on the basis of race, color, national origin, age, disability, or sex.

## 2017-05-05 NOTE — ASSESSMENT & PLAN NOTE
A discomfort consistent with round ligament pain  No contractions noted  Reactive NST  P monitor  PO hydrate  observe

## 2017-05-05 NOTE — SUBJECTIVE & OBJECTIVE
Obstetric HPI:  Patient reports None contractions, active fetal movement, No vaginal bleeding , No loss of fluid     This pregnancy has been complicated by tobacco use, hx seizures    Obstetric History       T2      TAB0   SAB1   E0   M0   L2       # Outcome Date GA Lbr Prosper/2nd Weight Sex Delivery Anes PTL Lv   4 Current            3 2014     SAB      2 Term 13   2.722 kg (6 lb) F Vag-Spont   Y   1 Term 04/15/11   2.722 kg (6 lb) F Vag-Spont   Y        Past Medical History:   Diagnosis Date    ADHD (attention deficit hyperactivity disorder)     Seizures     Tobacco use disorder      Past Surgical History:   Procedure Laterality Date    TONSILLECTOMY         PTA Medications   Medication Sig    prenatal vit#103-iron-FA () 27 mg iron- 1 mg Tab Take 1 tablet by mouth once daily.       Review of patient's allergies indicates:  No Known Allergies     Family History     Problem Relation (Age of Onset)    Hearing loss Other    Hepatitis Father    Hypertension Father        Social History Main Topics    Smoking status: Former Smoker     Packs/day: 0.00    Smokeless tobacco: Former User     Quit date: 1/10/2017    Alcohol use No    Drug use: No    Sexual activity: Yes     Partners: Male     Birth control/ protection: None     Review of Systems   Constitutional: Negative.    HENT: Negative.    Eyes: Negative.    Respiratory: Negative.    Cardiovascular: Negative.    Gastrointestinal: Negative.    Endocrine: Negative.    Genitourinary: Negative.    Musculoskeletal: Negative.    Skin:  Negative.   Neurological: Negative.    Hematological: Negative.    Psychiatric/Behavioral: Negative.    Breast: negative.    All other systems reviewed and are negative.     Objective:     Vital Signs (Most Recent):    Vital Signs (24h Range):           There is no height or weight on file to calculate BMI.    FHT: 140Cat 1 (reassuring)  TOCO:  Q 60 minutes    Physical Exam:   Constitutional: She is  oriented to person, place, and time. She appears well-developed and well-nourished.     Eyes: Conjunctivae are normal. Pupils are equal, round, and reactive to light.    Neck: Normal range of motion.    Cardiovascular: Normal rate and regular rhythm.     Pulmonary/Chest: Breath sounds normal.        Abdominal: Soft.     Genitourinary: Vagina normal and uterus normal.           Musculoskeletal: Normal range of motion and moves all extremeties.       Neurological: She is alert and oriented to person, place, and time.    Skin: Skin is warm.    Psychiatric: She has a normal mood and affect. Her behavior is normal. Thought content normal.       Cervix:  Dilation:  Effacement:    Station:   Presentation:      Significant Labs:  Lab Results   Component Value Date    GROUPTRH A POS 01/05/2017    HEPBSAG Negative 01/05/2017       I have personallly reviewed all pertinent lab results from the last 24 hours.

## 2017-05-05 NOTE — H&P
[facility]   Obstetrics  History & Physical    Patient Name: Donna Kwong  MRN: [id number]  Admission Date: 2017  Primary Care Provider: Marii Aguilar MD    Subjective:     Principal Problem:<principal problem not specified>    History of Present Illness:  Round ligament pain    Obstetric HPI:  Patient reports None contractions, active fetal movement, No vaginal bleeding , No loss of fluid     This pregnancy has been complicated by tobacco use, hx seizures    Obstetric History       T2      TAB0   SAB1   E0   M0   L2       # Outcome Date GA Lbr Prosper/2nd Weight Sex Delivery Anes PTL Lv   4 Current            3 2014     SAB      2 Term 13   2.722 kg (6 lb) F Vag-Spont   Y   1 Term 04/15/11   2.722 kg (6 lb) F Vag-Spont   Y        Past Medical History:   Diagnosis Date    ADHD (attention deficit hyperactivity disorder)     Seizures     Tobacco use disorder      Past Surgical History:   Procedure Laterality Date    TONSILLECTOMY         PTA Medications   Medication Sig    prenatal vit#103-iron-FA () 27 mg iron- 1 mg Tab Take 1 tablet by mouth once daily.       Review of patient's allergies indicates:  No Known Allergies     Family History     Problem Relation (Age of Onset)    Hearing loss Other    Hepatitis Father    Hypertension Father        Social History Main Topics    Smoking status: Former Smoker     Packs/day: 0.00    Smokeless tobacco: Former User     Quit date: 1/10/2017    Alcohol use No    Drug use: No    Sexual activity: Yes     Partners: Male     Birth control/ protection: None     Review of Systems   Constitutional: Negative.    HENT: Negative.    Eyes: Negative.    Respiratory: Negative.    Cardiovascular: Negative.    Gastrointestinal: Negative.    Endocrine: Negative.    Genitourinary: Negative.    Musculoskeletal: Negative.    Skin:  Negative.   Neurological: Negative.    Hematological: Negative.    Psychiatric/Behavioral: Negative.     Breast: negative.    All other systems reviewed and are negative.     Objective:     Vital Signs (Most Recent):    Vital Signs (24h Range):           There is no height or weight on file to calculate BMI.    FHT: 140Cat 1 (reassuring)  TOCO:  Q 60 minutes    Physical Exam:   Constitutional: She is oriented to person, place, and time. She appears well-developed and well-nourished.     Eyes: Conjunctivae are normal. Pupils are equal, round, and reactive to light.    Neck: Normal range of motion.    Cardiovascular: Normal rate and regular rhythm.     Pulmonary/Chest: Breath sounds normal.        Abdominal: Soft.     Genitourinary: Vagina normal and uterus normal.           Musculoskeletal: Normal range of motion and moves all extremeties.       Neurological: She is alert and oriented to person, place, and time.    Skin: Skin is warm.    Psychiatric: She has a normal mood and affect. Her behavior is normal. Thought content normal.       Cervix:  Dilation:  Effacement:    Station:   Presentation:      Significant Labs:  Lab Results   Component Value Date    GROUPTRH A POS 2017    HEPBSAG Negative 2017       I have personallly reviewed all pertinent lab results from the last 24 hours.    Assessment/Plan:     26 y.o. female  at 27w0d for:    Pain of round ligament complicating pregnancy, antepartum  A discomfort consistent with round ligament pain  No contractions noted  Reactive NST  P monitor  PO hydrate  observe      Taylor Tnaner CNM  Obstetrics  [facility]

## 2017-05-05 NOTE — ASSESSMENT & PLAN NOTE
A discomfort consistent with round ligament pain  No contractions noted  Reactive NST  P monitor  PO hydrate  Observe    Pain resolved with rest and hydration  Discharged home RX heat,tylenol and maternity support belt

## 2017-05-05 NOTE — DISCHARGE SUMMARY
[facility]  Obstetrics  Discharge Summary      Patient Name: Donna Kwong  MRN: [id number]  Admission Date: 5/4/2017  Hospital Length of Stay: 0 days  Discharge Date and Time:  05/04/2017 11:25 PM  Attending Physician: Jyoti Yanes, *   Discharging Provider: Taylor Tanner CNM  Primary Care Provider: Marii Aguilar MD    HPI: Round ligament pain    * No surgery found *     Hospital Course:   Reactive NST FFN to lab        Final Active Diagnoses:    Diagnosis Date Noted POA    PRINCIPAL PROBLEM:  Pain of round ligament complicating pregnancy, antepartum [O26.899, R10.2] 05/04/2017 Unknown    Round ligament pain [N94.9] 05/04/2017 Yes      Problems Resolved During this Admission:    Diagnosis Date Noted Date Resolved POA            Feeding Method: bottle    Immunizations     None          This patient has no babies on file.  Pending Diagnostic Studies:     None          Discharged Condition: good    Disposition: Home or Self Care    Follow Up:  Follow-up Information     Follow up In 1 week.        Patient Instructions:     Diet general     Activity as tolerated       Medications:  Current Discharge Medication List      CONTINUE these medications which have NOT CHANGED    Details   prenatal vit#103-iron-FA () 27 mg iron- 1 mg Tab Take 1 tablet by mouth once daily.  Qty: 30 tablet, Refills: 11             Taylor Tanner CNM  Obstetrics  [facility]

## 2017-05-19 ENCOUNTER — NURSE TRIAGE (OUTPATIENT)
Dept: ADMINISTRATIVE | Facility: CLINIC | Age: 27
End: 2017-05-19

## 2017-05-19 NOTE — TELEPHONE ENCOUNTER
Ms. Kwong is 7 months pregnant. She states she noted drops of bright red blood in the toilet this morning and when we she wiped. Patient states she only saw blood once today. She states she feels fine.

## 2017-05-22 ENCOUNTER — ROUTINE PRENATAL (OUTPATIENT)
Dept: OBSTETRICS AND GYNECOLOGY | Facility: CLINIC | Age: 27
End: 2017-05-22
Payer: MEDICAID

## 2017-05-22 ENCOUNTER — LAB VISIT (OUTPATIENT)
Dept: LAB | Facility: HOSPITAL | Age: 27
End: 2017-05-22
Attending: ADVANCED PRACTICE MIDWIFE
Payer: MEDICAID

## 2017-05-22 VITALS — BODY MASS INDEX: 27.3 KG/M2 | WEIGHT: 190.25 LBS | SYSTOLIC BLOOD PRESSURE: 114 MMHG | DIASTOLIC BLOOD PRESSURE: 62 MMHG

## 2017-05-22 DIAGNOSIS — Z23 NEED FOR TDAP VACCINATION: ICD-10-CM

## 2017-05-22 DIAGNOSIS — Z34.80 SUPERVISION OF OTHER NORMAL PREGNANCY: ICD-10-CM

## 2017-05-22 DIAGNOSIS — Z34.80 SUPERVISION OF OTHER NORMAL PREGNANCY: Primary | ICD-10-CM

## 2017-05-22 LAB
BASOPHILS # BLD AUTO: 0.01 K/UL
BASOPHILS NFR BLD: 0.1 %
DIFFERENTIAL METHOD: ABNORMAL
EOSINOPHIL # BLD AUTO: 0 K/UL
EOSINOPHIL NFR BLD: 0.4 %
ERYTHROCYTE [DISTWIDTH] IN BLOOD BY AUTOMATED COUNT: 12.4 %
GLUCOSE SERPL-MCNC: 99 MG/DL
HCT VFR BLD AUTO: 33.9 %
HGB BLD-MCNC: 11.9 G/DL
LYMPHOCYTES # BLD AUTO: 1.2 K/UL
LYMPHOCYTES NFR BLD: 12.8 %
MCH RBC QN AUTO: 32.9 PG
MCHC RBC AUTO-ENTMCNC: 35.1 %
MCV RBC AUTO: 94 FL
MONOCYTES # BLD AUTO: 0.6 K/UL
MONOCYTES NFR BLD: 6.5 %
NEUTROPHILS # BLD AUTO: 7.6 K/UL
NEUTROPHILS NFR BLD: 80 %
PLATELET # BLD AUTO: 161 K/UL
PMV BLD AUTO: 11.4 FL
RBC # BLD AUTO: 3.62 M/UL
WBC # BLD AUTO: 9.54 K/UL

## 2017-05-22 PROCEDURE — 86703 HIV-1/HIV-2 1 RESULT ANTBDY: CPT

## 2017-05-22 PROCEDURE — 99999 PR PBB SHADOW E&M-EST. PATIENT-LVL III: CPT | Mod: PBBFAC,,, | Performed by: ADVANCED PRACTICE MIDWIFE

## 2017-05-22 PROCEDURE — 36415 COLL VENOUS BLD VENIPUNCTURE: CPT

## 2017-05-22 PROCEDURE — 82950 GLUCOSE TEST: CPT

## 2017-05-22 PROCEDURE — 99213 OFFICE O/P EST LOW 20 MIN: CPT | Mod: TH,S$PBB,, | Performed by: ADVANCED PRACTICE MIDWIFE

## 2017-05-22 PROCEDURE — 85025 COMPLETE CBC W/AUTO DIFF WBC: CPT

## 2017-05-22 PROCEDURE — 86592 SYPHILIS TEST NON-TREP QUAL: CPT

## 2017-05-22 NOTE — PROGRESS NOTES
Denies c/o. Working again, good FM, no PTL s/s. One spotting episode when going to restroom, reassured, s/s to report discussed. Coffective counseling sheet Keep Baby Close discussed with mother. Reinforced rooming in practices, continued skin to skin, and quiet hours as requested by mother.  Encouraged mother to download Coffective mobile justine if she has not already done so. Mother verbalizes understanding. Coffective counseling sheet Learn Your Baby discussed with mother. Instructed regarding feeding cues and methods to calm baby. Encouraged mother to download Coffective mobile justine if she has not already done so.  Mother verbalized understanding. tdap today. Labs today. al

## 2017-05-22 NOTE — PROGRESS NOTES
Verified pt by two identifiers: name and date of birth.    Adacel 0.5 ml Given IM to right deltoid using aseptic technique. No discomfort noted, pt tolerated well. Advised to wait 15 minutes in clinic to monitor. Pt verbalized understanding.

## 2017-05-23 LAB
HIV 1+2 AB+HIV1 P24 AG SERPL QL IA: NEGATIVE
RPR SER QL: NORMAL

## 2017-06-06 ENCOUNTER — ROUTINE PRENATAL (OUTPATIENT)
Dept: OBSTETRICS AND GYNECOLOGY | Facility: CLINIC | Age: 27
End: 2017-06-06
Payer: MEDICAID

## 2017-06-06 VITALS — BODY MASS INDEX: 27.11 KG/M2 | WEIGHT: 188.94 LBS | DIASTOLIC BLOOD PRESSURE: 50 MMHG | SYSTOLIC BLOOD PRESSURE: 94 MMHG

## 2017-06-06 DIAGNOSIS — Z34.80 SUPERVISION OF OTHER NORMAL PREGNANCY: Primary | ICD-10-CM

## 2017-06-06 PROBLEM — N94.9 ROUND LIGAMENT PAIN: Status: RESOLVED | Noted: 2017-05-04 | Resolved: 2017-06-06

## 2017-06-06 PROBLEM — O26.899 PAIN OF ROUND LIGAMENT COMPLICATING PREGNANCY, ANTEPARTUM: Status: RESOLVED | Noted: 2017-05-04 | Resolved: 2017-06-06

## 2017-06-06 PROBLEM — R10.2 PAIN OF ROUND LIGAMENT COMPLICATING PREGNANCY, ANTEPARTUM: Status: RESOLVED | Noted: 2017-05-04 | Resolved: 2017-06-06
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
## 2017-06-06 PROCEDURE — 99999 PR PBB SHADOW E&M-EST. PATIENT-LVL III: CPT | Mod: PBBFAC,,, | Performed by: ADVANCED PRACTICE MIDWIFE

## 2017-06-06 PROCEDURE — 99213 OFFICE O/P EST LOW 20 MIN: CPT | Mod: PBBFAC | Performed by: ADVANCED PRACTICE MIDWIFE

## 2017-06-06 PROCEDURE — 99213 OFFICE O/P EST LOW 20 MIN: CPT | Mod: TH,S$PBB,, | Performed by: ADVANCED PRACTICE MIDWIFE

## 2017-06-06 NOTE — PROGRESS NOTES
Coffective counseling sheet Nourish discussed with mother. Reinforced basic breastfeeding position and latch as well as proper hand expression technique. Encouraged mother to download Coffective mobile justine if she has not already done so.  Mother verbalizes understanding. Doing well, no c/o. Good FM. al

## 2017-06-20 ENCOUNTER — ROUTINE PRENATAL (OUTPATIENT)
Dept: OBSTETRICS AND GYNECOLOGY | Facility: CLINIC | Age: 27
End: 2017-06-20
Payer: MEDICAID

## 2017-06-20 VITALS
BODY MASS INDEX: 27.08 KG/M2 | SYSTOLIC BLOOD PRESSURE: 110 MMHG | WEIGHT: 188.69 LBS | DIASTOLIC BLOOD PRESSURE: 62 MMHG

## 2017-06-20 DIAGNOSIS — F17.200 TOBACCO USE DISORDER: ICD-10-CM

## 2017-06-20 DIAGNOSIS — Z34.80 SUPERVISION OF OTHER NORMAL PREGNANCY: Primary | ICD-10-CM

## 2017-06-20 PROCEDURE — 99213 OFFICE O/P EST LOW 20 MIN: CPT | Mod: TH,S$PBB,, | Performed by: ADVANCED PRACTICE MIDWIFE

## 2017-06-20 PROCEDURE — 99212 OFFICE O/P EST SF 10 MIN: CPT | Mod: PBBFAC | Performed by: ADVANCED PRACTICE MIDWIFE

## 2017-06-20 PROCEDURE — 99999 PR PBB SHADOW E&M-EST. PATIENT-LVL II: CPT | Mod: PBBFAC,,, | Performed by: ADVANCED PRACTICE MIDWIFE

## 2017-06-20 NOTE — PROGRESS NOTES
Coffective counseling sheet Protect Breastfeeding discussed with mother. Reinforced avoidence of artifical nipples and formula, unless medically indicated.  Encouraged mother to download Coffective mobile justine if she has not already done so. Mother verbalizes understanding.    Lower abd with walking. Reassured. GBS next OV. al

## 2017-07-05 ENCOUNTER — ROUTINE PRENATAL (OUTPATIENT)
Dept: OBSTETRICS AND GYNECOLOGY | Facility: CLINIC | Age: 27
End: 2017-07-05
Payer: MEDICAID

## 2017-07-05 VITALS — BODY MASS INDEX: 27.9 KG/M2 | DIASTOLIC BLOOD PRESSURE: 70 MMHG | SYSTOLIC BLOOD PRESSURE: 108 MMHG | WEIGHT: 194.44 LBS

## 2017-07-05 DIAGNOSIS — Z34.80 SUPERVISION OF OTHER NORMAL PREGNANCY: Primary | ICD-10-CM

## 2017-07-05 DIAGNOSIS — F17.200 TOBACCO USE DISORDER: ICD-10-CM

## 2017-07-05 DIAGNOSIS — O26.849 UTERINE SIZE DATE DISCREPANCY, ANTEPARTUM CONDITION OR COMPLICATION: ICD-10-CM

## 2017-07-05 PROCEDURE — 99213 OFFICE O/P EST LOW 20 MIN: CPT | Mod: TH,S$PBB,, | Performed by: ADVANCED PRACTICE MIDWIFE

## 2017-07-05 PROCEDURE — 87081 CULTURE SCREEN ONLY: CPT

## 2017-07-05 PROCEDURE — 99999 PR PBB SHADOW E&M-EST. PATIENT-LVL II: CPT | Mod: PBBFAC,,, | Performed by: ADVANCED PRACTICE MIDWIFE

## 2017-07-05 PROCEDURE — 99212 OFFICE O/P EST SF 10 MIN: CPT | Mod: PBBFAC | Performed by: ADVANCED PRACTICE MIDWIFE

## 2017-07-05 NOTE — PROGRESS NOTES
GBS collected, S<D, still smoking a few cigarettes every few days or so. US for growth next OV. al

## 2017-07-07 LAB — BACTERIA SPEC AEROBE CULT: NORMAL

## 2017-07-10 ENCOUNTER — HOSPITAL ENCOUNTER (OUTPATIENT)
Facility: HOSPITAL | Age: 27
Discharge: HOME | End: 2017-07-10
Attending: OBSTETRICS & GYNECOLOGY | Admitting: OBSTETRICS & GYNECOLOGY
Payer: MEDICAID

## 2017-07-10 VITALS
DIASTOLIC BLOOD PRESSURE: 62 MMHG | SYSTOLIC BLOOD PRESSURE: 116 MMHG | WEIGHT: 194 LBS | HEIGHT: 70 IN | BODY MASS INDEX: 27.77 KG/M2 | HEART RATE: 99 BPM | TEMPERATURE: 98 F

## 2017-07-10 DIAGNOSIS — N89.8 CLEAR VAGINAL DISCHARGE: ICD-10-CM

## 2017-07-10 PROCEDURE — 59025 FETAL NON-STRESS TEST: CPT

## 2017-07-10 PROCEDURE — 99213 OFFICE O/P EST LOW 20 MIN: CPT | Mod: 25,TH,, | Performed by: ADVANCED PRACTICE MIDWIFE

## 2017-07-10 PROCEDURE — 99211 OFF/OP EST MAY X REQ PHY/QHP: CPT | Mod: 25

## 2017-07-10 RX ORDER — ACETAMINOPHEN 500 MG
500 TABLET ORAL EVERY 6 HOURS PRN
Status: DISCONTINUED | OUTPATIENT
Start: 2017-07-10 | End: 2017-07-10 | Stop reason: HOSPADM

## 2017-07-10 RX ORDER — ONDANSETRON 8 MG/1
8 TABLET, ORALLY DISINTEGRATING ORAL EVERY 8 HOURS PRN
Status: DISCONTINUED | OUTPATIENT
Start: 2017-07-10 | End: 2017-07-10 | Stop reason: HOSPADM

## 2017-07-10 NOTE — DISCHARGE SUMMARY
" [facility]  Obstetrics  Discharge Summary      Patient Name: Donna Kwong  MRN: [id number]  Admission Date: 7/10/2017  Hospital Length of Stay: 0 days  Discharge Date and Time: No discharge date for patient encounter.  Attending Physician: Jyoti Yanes, *   Discharging Provider: Destinee Jay CNM  Primary Care Provider: Marii Aguilar MD    HPI: No notes on file    * No surgery found *     Hospital Course:   7/10/17 at 0900hrs  No further "leakimnh, Cat 1 strip  SSE- Neg SROM, Fern and nitrazine with VC= 1/70/-2  DC home with precautions- Keep US abd appt scheduled for tomorrow        Final Active Diagnoses:    Diagnosis Date Noted POA    PRINCIPAL PROBLEM:  Clear vaginal discharge [N89.8] 07/10/2017 Yes      Problems Resolved During this Admission:    Diagnosis Date Noted Date Resolved POA        Labs: All labs within the past 24 hours have been reviewed        Immunizations     None          This patient has no babies on file.  Pending Diagnostic Studies:     None          Discharged Condition: good    Disposition: Home or Self Care    Follow Up:  Follow-up Information     Follow up In 1 day.               Patient Instructions:     Diet general     Activity as tolerated     Other restrictions (specify):   Order Comments: Avoid driving for 2 weeks     Call MD for:  temperature >100.4     Call MD for:  severe uncontrolled pain     Call MD for:  severe persistent headache     Call MD for:   Order Comments: Depression, increased bleeding, fever, breast issues or any concerns   Scheduling Instructions: Decreased FM, SROM, regular contractions or any concerns       Medications:  Current Discharge Medication List      STOP taking these medications       prenatal vit#103-iron-FA () 27 mg iron- 1 mg Tab Comments:   Reason for Stopping:               Destinee Jay CNM  Obstetrics  [facility]   "

## 2017-07-10 NOTE — SUBJECTIVE & OBJECTIVE
Obstetric HPI:  Patient reports None contractions, active fetal movement, absent vaginal bleeding , present loss of fluid      Objective:     Vital Signs (Most Recent):  Temp: 98.3 °F (36.8 °C) (07/10/17 0717)  Pulse: 99 (07/10/17 0629)  BP: 116/62 (07/10/17 0629) Vital Signs (24h Range):  Temp:  [98.3 °F (36.8 °C)] 98.3 °F (36.8 °C)  Pulse:  [99] 99  BP: (116)/(62) 116/62     Weight: 88 kg (194 lb)  Body mass index is 27.84 kg/m².    FHT: 120bpm reactiveCat 1 (reassuring)  TOCO:  Q None minutes    No intake or output data in the 24 hours ending 07/10/17 0914    Cervical Exam:  SSE Neg Fern/nitrazine or ferning  Dilation:  1  Effacement:  70%  Station: -2  Presentation: Vertex     Significant Labs:  Recent Lab Results     None          Physical Exam:   Constitutional: She is oriented to person, place, and time. She appears well-developed and well-nourished.    HENT:   Head: Normocephalic.     Neck: Normal range of motion. Neck supple. No thyromegaly present.    Cardiovascular: Normal rate, regular rhythm and normal heart sounds.  Exam reveals no edema.     Pulmonary/Chest: Effort normal and breath sounds normal. No respiratory distress.        Abdominal: Soft. Normal appearance and bowel sounds are normal. There is no tenderness. There is no rebound and no guarding. No hernia.     Genitourinary: Vagina normal. No vaginal discharge found.           Musculoskeletal: Normal range of motion. She exhibits no edema or tenderness.       Neurological: She is alert and oriented to person, place, and time. She has normal reflexes.    Skin: Skin is warm and dry. No rash noted.    Psychiatric: She has a normal mood and affect.

## 2017-07-10 NOTE — HOSPITAL COURSE
"7/10/17 at 0900hrs  No further "leakimnh, Cat 1 strip  SSE- Neg SROM, Fern and nitrazine with VC= 1/70/-2  DC home with precautions- Keep US abd appt scheduled for tomorrow  "

## 2017-07-10 NOTE — PROGRESS NOTES
"[facility]  Obstetrics  Antepartum Progress Note    Patient Name: Donna Kwong  MRN: [id number]  Admission Date: 7/10/2017  Hospital Length of Stay: 0 days  Attending Physician: Jyoti Yanes, *  Primary Care Provider: Marii Aguilar MD    Subjective:     Principal Problem:Clear vaginal discharge    HPI:  No notes on file    Hospital Course:  7/10/17 at 0900hrs  No further "leakimnh, Cat 1 strip  SSE- Neg SROM, Fern and nitrazine with VC= 1/70/-2  DC home with precautions- Keep US abd appt scheduled for tomorrow    Obstetric HPI:  Patient reports None contractions, active fetal movement, absent vaginal bleeding , present loss of fluid      Objective:     Vital Signs (Most Recent):  Temp: 98.3 °F (36.8 °C) (07/10/17 0717)  Pulse: 99 (07/10/17 0629)  BP: 116/62 (07/10/17 0629) Vital Signs (24h Range):  Temp:  [98.3 °F (36.8 °C)] 98.3 °F (36.8 °C)  Pulse:  [99] 99  BP: (116)/(62) 116/62     Weight: 88 kg (194 lb)  Body mass index is 27.84 kg/m².    FHT: 120bpm reactiveCat 1 (reassuring)  TOCO:  Q None minutes    No intake or output data in the 24 hours ending 07/10/17 0914    Cervical Exam:  SSE Neg Fern/nitrazine or ferning  Dilation:  1  Effacement:  70%  Station: -2  Presentation: Vertex     Significant Labs:  Recent Lab Results     None          Physical Exam:   Constitutional: She is oriented to person, place, and time. She appears well-developed and well-nourished.    HENT:   Head: Normocephalic.     Neck: Normal range of motion. Neck supple. No thyromegaly present.    Cardiovascular: Normal rate, regular rhythm and normal heart sounds.  Exam reveals no edema.     Pulmonary/Chest: Effort normal and breath sounds normal. No respiratory distress.        Abdominal: Soft. Normal appearance and bowel sounds are normal. There is no tenderness. There is no rebound and no guarding. No hernia.     Genitourinary: Vagina normal. No vaginal discharge found.           Musculoskeletal: Normal " range of motion. She exhibits no edema or tenderness.       Neurological: She is alert and oriented to person, place, and time. She has normal reflexes.    Skin: Skin is warm and dry. No rash noted.    Psychiatric: She has a normal mood and affect.       Assessment/Plan:     26 y.o. female  at 36w4d for:    * Clear vaginal discharge    Cat I strip   SSE- No evidence of SROM  DC home              Destinee Jay CNM  Obstetrics  [facility]

## 2017-07-11 ENCOUNTER — ROUTINE PRENATAL (OUTPATIENT)
Dept: OBSTETRICS AND GYNECOLOGY | Facility: CLINIC | Age: 27
End: 2017-07-11
Payer: MEDICAID

## 2017-07-11 ENCOUNTER — PROCEDURE VISIT (OUTPATIENT)
Dept: OBSTETRICS AND GYNECOLOGY | Facility: CLINIC | Age: 27
End: 2017-07-11
Payer: MEDICAID

## 2017-07-11 VITALS — SYSTOLIC BLOOD PRESSURE: 112 MMHG | WEIGHT: 192 LBS | DIASTOLIC BLOOD PRESSURE: 58 MMHG | BODY MASS INDEX: 27.55 KG/M2

## 2017-07-11 DIAGNOSIS — Z3A.36 PREGNANCY WITH 36 COMPLETED WEEKS GESTATION: Primary | ICD-10-CM

## 2017-07-11 DIAGNOSIS — Z34.80 SUPERVISION OF OTHER NORMAL PREGNANCY: ICD-10-CM

## 2017-07-11 DIAGNOSIS — G40.909 SEIZURE DISORDER: ICD-10-CM

## 2017-07-11 DIAGNOSIS — O26.849 UTERINE SIZE DATE DISCREPANCY, ANTEPARTUM CONDITION OR COMPLICATION: ICD-10-CM

## 2017-07-11 PROCEDURE — 99999 PR PBB SHADOW E&M-EST. PATIENT-LVL II: CPT | Mod: PBBFAC,,, | Performed by: OBSTETRICS & GYNECOLOGY

## 2017-07-11 PROCEDURE — 76815 OB US LIMITED FETUS(S): CPT | Mod: 26,59,S$PBB, | Performed by: OBSTETRICS & GYNECOLOGY

## 2017-07-11 PROCEDURE — 99212 OFFICE O/P EST SF 10 MIN: CPT | Mod: TH,S$PBB,, | Performed by: OBSTETRICS & GYNECOLOGY

## 2017-07-11 PROCEDURE — 76819 FETAL BIOPHYS PROFIL W/O NST: CPT | Mod: 26,59,S$PBB, | Performed by: OBSTETRICS & GYNECOLOGY

## 2017-07-11 PROCEDURE — 76815 OB US LIMITED FETUS(S): CPT | Mod: 59,PBBFAC | Performed by: OBSTETRICS & GYNECOLOGY

## 2017-07-11 PROCEDURE — 76819 FETAL BIOPHYS PROFIL W/O NST: CPT | Mod: 59,PBBFAC | Performed by: OBSTETRICS & GYNECOLOGY

## 2017-07-11 NOTE — PROGRESS NOTES
Doing well.  Went to L&D yesterday for vaginal discharge.  Exam negative for SROM.  Sono today growth at 28th%, BPP 8/8, DAPHNE, 13.6cm.  Reviewed leukorrhea of pregnancy.  Reviewed negative GBBS with the patient.  Repeat FHR with doppler 159 bpm.  L&D precautions discussed.  RTC 1 week with FREDDIE.

## 2017-07-18 ENCOUNTER — ROUTINE PRENATAL (OUTPATIENT)
Dept: OBSTETRICS AND GYNECOLOGY | Facility: CLINIC | Age: 27
End: 2017-07-18
Payer: MEDICAID

## 2017-07-18 VITALS
DIASTOLIC BLOOD PRESSURE: 64 MMHG | BODY MASS INDEX: 27.62 KG/M2 | SYSTOLIC BLOOD PRESSURE: 104 MMHG | WEIGHT: 192.44 LBS

## 2017-07-18 DIAGNOSIS — Z34.80 SUPERVISION OF OTHER NORMAL PREGNANCY: Primary | ICD-10-CM

## 2017-07-18 PROCEDURE — 99212 OFFICE O/P EST SF 10 MIN: CPT | Mod: PBBFAC | Performed by: ADVANCED PRACTICE MIDWIFE

## 2017-07-18 PROCEDURE — 99999 PR PBB SHADOW E&M-EST. PATIENT-LVL II: CPT | Mod: PBBFAC,,, | Performed by: ADVANCED PRACTICE MIDWIFE

## 2017-07-18 PROCEDURE — 99213 OFFICE O/P EST LOW 20 MIN: CPT | Mod: TH,S$PBB,, | Performed by: ADVANCED PRACTICE MIDWIFE

## 2017-07-18 NOTE — PROGRESS NOTES
Doing well, +FM. Denies concerns, complains of white vaginal discharge w/o ordor, itching. Neg wet mount. Discussed FKCs and S/S of labor. Jackson/al

## 2017-07-25 ENCOUNTER — ROUTINE PRENATAL (OUTPATIENT)
Dept: OBSTETRICS AND GYNECOLOGY | Facility: CLINIC | Age: 27
End: 2017-07-25
Payer: MEDICAID

## 2017-07-25 VITALS
DIASTOLIC BLOOD PRESSURE: 62 MMHG | SYSTOLIC BLOOD PRESSURE: 108 MMHG | WEIGHT: 193.13 LBS | BODY MASS INDEX: 27.71 KG/M2

## 2017-07-25 DIAGNOSIS — Z34.80 SUPERVISION OF OTHER NORMAL PREGNANCY: Primary | ICD-10-CM

## 2017-07-25 DIAGNOSIS — F17.200 TOBACCO USE DISORDER: ICD-10-CM

## 2017-07-25 PROCEDURE — 99213 OFFICE O/P EST LOW 20 MIN: CPT | Mod: TH,S$PBB,, | Performed by: ADVANCED PRACTICE MIDWIFE

## 2017-07-25 PROCEDURE — 99999 PR PBB SHADOW E&M-EST. PATIENT-LVL II: CPT | Mod: PBBFAC,,, | Performed by: ADVANCED PRACTICE MIDWIFE

## 2017-07-25 PROCEDURE — 99212 OFFICE O/P EST SF 10 MIN: CPT | Mod: PBBFAC | Performed by: ADVANCED PRACTICE MIDWIFE

## 2017-07-25 NOTE — PROGRESS NOTES
cx soft, denies RUC. Requesting an induction date, her father is in [place]. Induction sched in LND 8/9/17 @ 5AM, reassured will more than likely have baby by that date. al

## 2017-07-27 ENCOUNTER — TELEPHONE (OUTPATIENT)
Dept: OBSTETRICS AND GYNECOLOGY | Facility: CLINIC | Age: 27
End: 2017-07-27

## 2017-07-27 ENCOUNTER — CLINICAL SUPPORT (OUTPATIENT)
Dept: OBSTETRICS AND GYNECOLOGY | Facility: CLINIC | Age: 27
End: 2017-07-27
Payer: MEDICAID

## 2017-07-27 DIAGNOSIS — O26.893 HEADACHE IN PREGNANCY, ANTEPARTUM, THIRD TRIMESTER: Primary | ICD-10-CM

## 2017-07-27 DIAGNOSIS — R51.9 HEADACHE IN PREGNANCY, ANTEPARTUM, THIRD TRIMESTER: Primary | ICD-10-CM

## 2017-07-27 RX ORDER — BUTALBITAL, ACETAMINOPHEN AND CAFFEINE 50; 325; 40 MG/1; MG/1; MG/1
2 TABLET ORAL EVERY 6 HOURS PRN
Qty: 20 TABLET | Refills: 0 | Status: SHIPPED | OUTPATIENT
Start: 2017-07-27 | End: 2017-08-26

## 2017-07-27 RX ORDER — BUTALBITAL, ACETAMINOPHEN AND CAFFEINE 50; 325; 40 MG/1; MG/1; MG/1
2 TABLET ORAL EVERY 6 HOURS PRN
Qty: 20 TABLET | Refills: 0 | Status: CANCELLED | OUTPATIENT
Start: 2017-07-27 | End: 2017-08-26

## 2017-07-27 NOTE — PROGRESS NOTES
Two pt identifiers verified   Pt in clinic for a nurse visit to check blood pressure due to having a headache not relieved by tylenol.   BP check in Right arm with pt sitting- /62  BP checked in left arm with pt sitting- /58  Vj Roe CNM notified of BP readings, states that BP is okay and that she will call in Fioricet to help with pt's headache.

## 2017-07-27 NOTE — TELEPHONE ENCOUNTER
----- Message from Caitlyn Trujillo sent at 7/27/2017  8:58 AM CDT -----  Contact: pt  She's calling stating that she has bad headaches and Tylenol is not working, wants to see if she can come in for an appointment sooner than 8/1/17, please advise [phone]

## 2017-08-01 ENCOUNTER — ROUTINE PRENATAL (OUTPATIENT)
Dept: OBSTETRICS AND GYNECOLOGY | Facility: CLINIC | Age: 27
End: 2017-08-01
Payer: MEDICAID

## 2017-08-01 VITALS
BODY MASS INDEX: 28.18 KG/M2 | DIASTOLIC BLOOD PRESSURE: 74 MMHG | WEIGHT: 196.44 LBS | SYSTOLIC BLOOD PRESSURE: 110 MMHG

## 2017-08-01 DIAGNOSIS — F17.200 TOBACCO USE DISORDER: ICD-10-CM

## 2017-08-01 DIAGNOSIS — Z34.80 SUPERVISION OF OTHER NORMAL PREGNANCY: Primary | ICD-10-CM

## 2017-08-01 PROCEDURE — 99999 PR PBB SHADOW E&M-EST. PATIENT-LVL II: CPT | Mod: PBBFAC,,, | Performed by: ADVANCED PRACTICE MIDWIFE

## 2017-08-01 PROCEDURE — 99213 OFFICE O/P EST LOW 20 MIN: CPT | Mod: TH,S$PBB,, | Performed by: ADVANCED PRACTICE MIDWIFE

## 2017-08-01 RX ORDER — OXYTOCIN/RINGER'S LACTATE 20/1000 ML
2 PLASTIC BAG, INJECTION (ML) INTRAVENOUS CONTINUOUS
Status: CANCELLED | OUTPATIENT
Start: 2017-08-01

## 2017-08-01 RX ORDER — OXYTOCIN/RINGER'S LACTATE 20/1000 ML
333 PLASTIC BAG, INJECTION (ML) INTRAVENOUS
Status: CANCELLED | OUTPATIENT
Start: 2017-08-01

## 2017-08-01 RX ORDER — SODIUM CHLORIDE, SODIUM LACTATE, POTASSIUM CHLORIDE, CALCIUM CHLORIDE 600; 310; 30; 20 MG/100ML; MG/100ML; MG/100ML; MG/100ML
INJECTION, SOLUTION INTRAVENOUS CONTINUOUS
Status: CANCELLED | OUTPATIENT
Start: 2017-08-01

## 2017-08-01 RX ORDER — ONDANSETRON 4 MG/1
8 TABLET, ORALLY DISINTEGRATING ORAL EVERY 8 HOURS PRN
Status: CANCELLED | OUTPATIENT
Start: 2017-08-01

## 2017-08-01 NOTE — PROGRESS NOTES
cx soft, favorable, membrane sweep done. Pt ready. Induction is sched in case undelivered by Aug 9th 5 AM. al

## 2017-08-02 ENCOUNTER — ANESTHESIA (OUTPATIENT)
Dept: OBSTETRICS AND GYNECOLOGY | Facility: HOSPITAL | Age: 27
End: 2017-08-02
Payer: MEDICAID

## 2017-08-02 ENCOUNTER — ANESTHESIA EVENT (OUTPATIENT)
Dept: OBSTETRICS AND GYNECOLOGY | Facility: HOSPITAL | Age: 27
End: 2017-08-02
Payer: MEDICAID

## 2017-08-02 ENCOUNTER — HOSPITAL ENCOUNTER (INPATIENT)
Facility: HOSPITAL | Age: 27
LOS: 2 days | Discharge: HOME | End: 2017-08-04
Attending: OBSTETRICS & GYNECOLOGY | Admitting: OBSTETRICS & GYNECOLOGY
Payer: MEDICAID

## 2017-08-02 DIAGNOSIS — N89.8 CLEAR VAGINAL DISCHARGE: ICD-10-CM

## 2017-08-02 DIAGNOSIS — Z34.80 SUPERVISION OF OTHER NORMAL PREGNANCY: ICD-10-CM

## 2017-08-02 LAB
ABO + RH BLD: NORMAL
BASOPHILS # BLD AUTO: 0.01 K/UL
BASOPHILS NFR BLD: 0.1 %
BLD GP AB SCN CELLS X3 SERPL QL: NORMAL
DIFFERENTIAL METHOD: ABNORMAL
EOSINOPHIL # BLD AUTO: 0.1 K/UL
EOSINOPHIL NFR BLD: 0.4 %
ERYTHROCYTE [DISTWIDTH] IN BLOOD BY AUTOMATED COUNT: 13 %
HCT VFR BLD AUTO: 33.8 %
HGB BLD-MCNC: 12 G/DL
LYMPHOCYTES # BLD AUTO: 1.5 K/UL
LYMPHOCYTES NFR BLD: 11.5 %
MCH RBC QN AUTO: 32.3 PG
MCHC RBC AUTO-ENTMCNC: 35.5 G/DL
MCV RBC AUTO: 91 FL
MONOCYTES # BLD AUTO: 0.9 K/UL
MONOCYTES NFR BLD: 6.7 %
NEUTROPHILS # BLD AUTO: 10.6 K/UL
NEUTROPHILS NFR BLD: 81.3 %
PLATELET # BLD AUTO: 135 K/UL
PMV BLD AUTO: 10.5 FL
RBC # BLD AUTO: 3.72 M/UL
WBC # BLD AUTO: 13.09 K/UL

## 2017-08-02 PROCEDURE — 62326 NJX INTERLAMINAR LMBR/SAC: CPT | Performed by: NURSE ANESTHETIST, CERTIFIED REGISTERED

## 2017-08-02 PROCEDURE — 10907ZC DRAINAGE OF AMNIOTIC FLUID, THERAPEUTIC FROM PRODUCTS OF CONCEPTION, VIA NATURAL OR ARTIFICIAL OPENING: ICD-10-PCS | Performed by: OBSTETRICS & GYNECOLOGY

## 2017-08-02 PROCEDURE — 51701 INSERT BLADDER CATHETER: CPT

## 2017-08-02 PROCEDURE — 59409 OBSTETRICAL CARE: CPT | Mod: GB,,, | Performed by: ADVANCED PRACTICE MIDWIFE

## 2017-08-02 PROCEDURE — 63600175 PHARM REV CODE 636 W HCPCS: Performed by: NURSE ANESTHETIST, CERTIFIED REGISTERED

## 2017-08-02 PROCEDURE — 25000003 PHARM REV CODE 250: Performed by: ADVANCED PRACTICE MIDWIFE

## 2017-08-02 PROCEDURE — 72100002 HC LABOR CARE, 1ST 8 HOURS

## 2017-08-02 PROCEDURE — 86900 BLOOD TYPING SEROLOGIC ABO: CPT

## 2017-08-02 PROCEDURE — 99211 OFF/OP EST MAY X REQ PHY/QHP: CPT | Mod: 25

## 2017-08-02 PROCEDURE — 85025 COMPLETE CBC W/AUTO DIFF WBC: CPT

## 2017-08-02 PROCEDURE — 72200005 HC VAGINAL DELIVERY LEVEL II

## 2017-08-02 PROCEDURE — 86901 BLOOD TYPING SEROLOGIC RH(D): CPT

## 2017-08-02 PROCEDURE — 27800517 HC TRAY,EPIDURAL-CONTINUOUS: Performed by: NURSE ANESTHETIST, CERTIFIED REGISTERED

## 2017-08-02 PROCEDURE — 11000001 HC ACUTE MED/SURG PRIVATE ROOM

## 2017-08-02 RX ORDER — ONDANSETRON 8 MG/1
8 TABLET, ORALLY DISINTEGRATING ORAL EVERY 8 HOURS PRN
Status: DISCONTINUED | OUTPATIENT
Start: 2017-08-02 | End: 2017-08-02

## 2017-08-02 RX ORDER — SODIUM CHLORIDE, SODIUM LACTATE, POTASSIUM CHLORIDE, CALCIUM CHLORIDE 600; 310; 30; 20 MG/100ML; MG/100ML; MG/100ML; MG/100ML
INJECTION, SOLUTION INTRAVENOUS CONTINUOUS
Status: DISCONTINUED | OUTPATIENT
Start: 2017-08-02 | End: 2017-08-02

## 2017-08-02 RX ORDER — ACETAMINOPHEN 325 MG/1
650 TABLET ORAL EVERY 6 HOURS PRN
Status: DISCONTINUED | OUTPATIENT
Start: 2017-08-02 | End: 2017-08-04 | Stop reason: HOSPADM

## 2017-08-02 RX ORDER — DOCUSATE SODIUM 100 MG/1
100 CAPSULE, LIQUID FILLED ORAL DAILY
Status: DISCONTINUED | OUTPATIENT
Start: 2017-08-03 | End: 2017-08-04 | Stop reason: HOSPADM

## 2017-08-02 RX ORDER — ONDANSETRON 8 MG/1
8 TABLET, ORALLY DISINTEGRATING ORAL EVERY 8 HOURS PRN
Status: DISCONTINUED | OUTPATIENT
Start: 2017-08-02 | End: 2017-08-04 | Stop reason: HOSPADM

## 2017-08-02 RX ORDER — HYDROCORTISONE 25 MG/G
CREAM TOPICAL 3 TIMES DAILY PRN
Status: DISCONTINUED | OUTPATIENT
Start: 2017-08-02 | End: 2017-08-04 | Stop reason: HOSPADM

## 2017-08-02 RX ORDER — OXYCODONE AND ACETAMINOPHEN 10; 325 MG/1; MG/1
1 TABLET ORAL EVERY 4 HOURS PRN
Status: DISCONTINUED | OUTPATIENT
Start: 2017-08-02 | End: 2017-08-04 | Stop reason: HOSPADM

## 2017-08-02 RX ORDER — ACETAMINOPHEN 500 MG
500 TABLET ORAL EVERY 6 HOURS PRN
Status: DISCONTINUED | OUTPATIENT
Start: 2017-08-02 | End: 2017-08-02

## 2017-08-02 RX ORDER — OXYTOCIN/RINGER'S LACTATE 20/1000 ML
2 PLASTIC BAG, INJECTION (ML) INTRAVENOUS CONTINUOUS
Status: DISCONTINUED | OUTPATIENT
Start: 2017-08-02 | End: 2017-08-02

## 2017-08-02 RX ORDER — ONDANSETRON 8 MG/1
8 TABLET, ORALLY DISINTEGRATING ORAL EVERY 8 HOURS PRN
Status: DISCONTINUED | OUTPATIENT
Start: 2017-08-02 | End: 2017-08-02 | Stop reason: SDUPTHER

## 2017-08-02 RX ORDER — OXYTOCIN/RINGER'S LACTATE 20/1000 ML
36 PLASTIC BAG, INJECTION (ML) INTRAVENOUS
Status: DISCONTINUED | OUTPATIENT
Start: 2017-08-02 | End: 2017-08-04 | Stop reason: HOSPADM

## 2017-08-02 RX ORDER — IBUPROFEN 600 MG/1
600 TABLET ORAL EVERY 6 HOURS
Status: DISCONTINUED | OUTPATIENT
Start: 2017-08-03 | End: 2017-08-04 | Stop reason: HOSPADM

## 2017-08-02 RX ORDER — ROPIVACAINE HYDROCHLORIDE 2 MG/ML
INJECTION, SOLUTION EPIDURAL; INFILTRATION; PERINEURAL
Status: DISCONTINUED | OUTPATIENT
Start: 2017-08-02 | End: 2017-08-03

## 2017-08-02 RX ORDER — SODIUM CHLORIDE, SODIUM LACTATE, POTASSIUM CHLORIDE, CALCIUM CHLORIDE 600; 310; 30; 20 MG/100ML; MG/100ML; MG/100ML; MG/100ML
INJECTION, SOLUTION INTRAVENOUS CONTINUOUS
Status: DISCONTINUED | OUTPATIENT
Start: 2017-08-02 | End: 2017-08-04 | Stop reason: HOSPADM

## 2017-08-02 RX ORDER — ROPIVACAINE HYDROCHLORIDE 2 MG/ML
INJECTION, SOLUTION EPIDURAL; INFILTRATION; PERINEURAL CONTINUOUS PRN
Status: DISCONTINUED | OUTPATIENT
Start: 2017-08-02 | End: 2017-08-03

## 2017-08-02 RX ORDER — ROPIVACAINE IN 0.9% SOD CHL/PF 0.2 %
PLASTIC BAG, INJECTION (ML) EPIDURAL CONTINUOUS
Status: DISCONTINUED | OUTPATIENT
Start: 2017-08-02 | End: 2017-08-04

## 2017-08-02 RX ORDER — AMMONIA 15 % (W/V)
0.3 AMPUL (EA) INHALATION CONTINUOUS PRN
Status: DISCONTINUED | OUTPATIENT
Start: 2017-08-02 | End: 2017-08-04 | Stop reason: HOSPADM

## 2017-08-02 RX ORDER — OXYCODONE AND ACETAMINOPHEN 5; 325 MG/1; MG/1
1 TABLET ORAL EVERY 4 HOURS PRN
Status: DISCONTINUED | OUTPATIENT
Start: 2017-08-02 | End: 2017-08-04 | Stop reason: HOSPADM

## 2017-08-02 RX ORDER — OXYTOCIN/RINGER'S LACTATE 20/1000 ML
2 PLASTIC BAG, INJECTION (ML) INTRAVENOUS CONTINUOUS
Status: DISCONTINUED | OUTPATIENT
Start: 2017-08-02 | End: 2017-08-04 | Stop reason: HOSPADM

## 2017-08-02 RX ORDER — DIPHENHYDRAMINE HCL 25 MG
25 CAPSULE ORAL EVERY 4 HOURS PRN
Status: DISCONTINUED | OUTPATIENT
Start: 2017-08-02 | End: 2017-08-04 | Stop reason: HOSPADM

## 2017-08-02 RX ORDER — OXYTOCIN/RINGER'S LACTATE 20/1000 ML
333 PLASTIC BAG, INJECTION (ML) INTRAVENOUS
Status: DISCONTINUED | OUTPATIENT
Start: 2017-08-02 | End: 2017-08-04 | Stop reason: HOSPADM

## 2017-08-02 RX ADMIN — SODIUM CHLORIDE, SODIUM LACTATE, POTASSIUM CHLORIDE, AND CALCIUM CHLORIDE: .6; .31; .03; .02 INJECTION, SOLUTION INTRAVENOUS at 08:08

## 2017-08-02 RX ADMIN — SODIUM CHLORIDE, SODIUM LACTATE, POTASSIUM CHLORIDE, AND CALCIUM CHLORIDE 1000 ML: .6; .31; .03; .02 INJECTION, SOLUTION INTRAVENOUS at 05:08

## 2017-08-02 RX ADMIN — ROPIVACAINE HYDROCHLORIDE 14 ML/HR: 2 INJECTION, SOLUTION EPIDURAL; INFILTRATION at 06:08

## 2017-08-02 RX ADMIN — Medication 2 MILLI-UNITS/MIN: at 08:08

## 2017-08-02 RX ADMIN — ROPIVACAINE HYDROCHLORIDE 12 ML: 2 INJECTION, SOLUTION EPIDURAL; INFILTRATION at 06:08

## 2017-08-02 RX ADMIN — SODIUM CHLORIDE, SODIUM LACTATE, POTASSIUM CHLORIDE, AND CALCIUM CHLORIDE: .6; .31; .03; .02 INJECTION, SOLUTION INTRAVENOUS at 05:08

## 2017-08-02 NOTE — PROGRESS NOTES
[facility] -   Obstetrics  Antepartum Progress Note    Patient Name: Donna Kwong  MRN: [id number]  Admission Date: 2017  Hospital Length of Stay: 0 days  Attending Physician: STEPHANIE Murray MD  Primary Care Provider: Marii Aguilar MD    Subjective:     Principal Problem:<principal problem not specified>    HPI:   IUP at 39w6d presents c/o gush of fluid a hour ago    Hospital Course:  Sterile speculum exam negative nitrazine, negative pool  cx 4/100/-1    Obstetric HPI:  Patient reports Frequency: Every 10 minutes contractions, active fetal movement, No vaginal bleeding , No loss of fluid     This pregnancy has been complicated by seizure disorder, tobacco use    Obstetric History       T2      L2     SAB0   TAB0   Ectopic0   Multiple0   Live Births2       # Outcome Date GA Lbr Prosper/2nd Weight Sex Delivery Anes PTL Lv   4 Current            3 2014     SAB      2 Term 13   2.722 kg (6 lb) F Vag-Spont   CONSTANTINO   1 Term 04/15/11   2.722 kg (6 lb) F Vag-Spont   CONSTANTINO        Past Medical History:   Diagnosis Date    ADHD (attention deficit hyperactivity disorder)     Seizures     Tobacco use disorder      Past Surgical History:   Procedure Laterality Date    TONSILLECTOMY         PTA Medications   Medication Sig    butalbital-acetaminophen-caffeine -40 mg (FIORICET, ESGIC) -40 mg per tablet Take 2 tablets by mouth every 6 (six) hours as needed for Pain.    PRENATAL VITS15/IRON/FOLIC/DSS (PRENATAL AD ORAL) Take 1 tablet by mouth once daily.       Review of patient's allergies indicates:  No Known Allergies     Family History     Problem Relation (Age of Onset)    Hearing loss Other    Hepatitis Father    Hypertension Father        Social History Main Topics    Smoking status: Former Smoker     Packs/day: 0.00    Smokeless tobacco: Former User     Quit date: 1/10/2017    Alcohol use No    Drug use: No    Sexual activity: Yes     Partners: Male      Birth control/ protection: None     Review of Systems   Constitutional: Negative.    HENT: Negative.    Eyes: Negative.    Respiratory: Negative.    Cardiovascular: Negative.    Gastrointestinal: Negative.    Endocrine: Negative.    Genitourinary: Negative.    Musculoskeletal: Negative.    Skin:  Negative.   Neurological: Negative.    Hematological: Negative.    Psychiatric/Behavioral: Negative.    Breast: negative.    All other systems reviewed and are negative.     Objective:     Vital Signs (Most Recent):    Vital Signs (24h Range):           There is no height or weight on file to calculate BMI.    FHT: 138Cat 1 (reassuring)  TOCO:  Q 10 minutes    Physical Exam:   Constitutional: She is oriented to person, place, and time. She appears well-developed and well-nourished.     Eyes: Conjunctivae are normal. Pupils are equal, round, and reactive to light.    Neck: Normal range of motion.    Cardiovascular: Normal rate and regular rhythm.     Pulmonary/Chest: Breath sounds normal.        Abdominal: Soft.     Genitourinary: Vagina normal and uterus normal.           Musculoskeletal: Normal range of motion and moves all extremeties.       Neurological: She is alert and oriented to person, place, and time.    Skin: Skin is warm.    Psychiatric: She has a normal mood and affect. Her behavior is normal. Thought content normal.       Cervix:  Dilation:  4  Effacement:  100%  Station: -1  Presentation: Vertex     Significant Labs:  Lab Results   Component Value Date    GROUPTRH A POS 2017    HEPBSAG Negative 2017    STREPBCULT No Group B Streptococcus isolated 2017       I have personallly reviewed all pertinent lab results from the last 24 hours.    Assessment/Plan:     26 y.o. female  at 39w6d for:    Clear vaginal discharge    A IUP at 39w6d  GBS negative  IBOW  P monitor and assess  Reassess after 2 hours              Taylor Tanner CNM  Obstetrics  [facility]

## 2017-08-02 NOTE — ANESTHESIA PREPROCEDURE EVALUATION
08/02/2017  Donna Kwong is a 26 y.o., female.    Anesthesia Evaluation    I have reviewed the Patient Summary Reports.    I have reviewed the Nursing Notes.      Review of Systems  Anesthesia Hx:  No problems with previous Anesthesia  Denies Family Hx of Anesthesia complications.   Denies Personal Hx of Anesthesia complications.   Social:  Smoker    Hematology/Oncology:  Hematology Normal   Oncology Normal     EENT/Dental:EENT/Dental Normal   Cardiovascular:  Cardiovascular Normal Exercise tolerance: good     Pulmonary:  Pulmonary Normal    Renal/:  Renal/ Normal     Hepatic/GI:  Hepatic/GI Normal    Musculoskeletal:  Musculoskeletal Normal    Neurological:   Seizures, well controlled    Endocrine:  Endocrine Normal    Dermatological:  Skin Normal    Psych:   Psychiatric History          Physical Exam  General:  Well nourished    Airway/Jaw/Neck:  Airway Findings: Mouth Opening: Normal Tongue: Normal     Eyes/Ears/Nose:  EYES/EARS/NOSE FINDINGS: Normal    Chest/Lungs:  Chest/Lungs Findings: Normal Respiratory Rate     Heart/Vascular:  Heart Findings: Rhythm: Regular Rhythm        Mental Status:  Mental Status Findings:  Cooperative, Alert and Oriented         Anesthesia Plan  Type of Anesthesia, risks & benefits discussed:  Anesthesia Type:  epidural  Patient's Preference:   Intra-op Monitoring Plan:   Intra-op Monitoring Plan Comments:   Post Op Pain Control Plan:   Post Op Pain Control Plan Comments:   Induction:    Beta Blocker:  Patient is not currently on a Beta-Blocker (No further documentation required).       Informed Consent: Patient understands risks and agrees with Anesthesia plan.  Questions answered. Anesthesia consent signed with patient.  ASA Score: 2     Day of Surgery Review of History & Physical: I have interviewed and examined the patient. I have reviewed the patient's H&P dated:   There are no significant changes.          Ready For Surgery From Anesthesia Perspective.

## 2017-08-02 NOTE — SUBJECTIVE & OBJECTIVE
Obstetric HPI:  Patient reports Frequency: Every 10 minutes contractions, active fetal movement, No vaginal bleeding , No loss of fluid     This pregnancy has been complicated by seizure disorder, tobacco use    Obstetric History       T2      L2     SAB0   TAB0   Ectopic0   Multiple0   Live Births2       # Outcome Date GA Lbr Prosper/2nd Weight Sex Delivery Anes PTL Lv   4 Current            3 2014     SAB      2 Term 13   2.722 kg (6 lb) F Vag-Spont   CONSTANTINO   1 Term 04/15/11   2.722 kg (6 lb) F Vag-Spont   CONSTANTINO        Past Medical History:   Diagnosis Date    ADHD (attention deficit hyperactivity disorder)     Seizures     Tobacco use disorder      Past Surgical History:   Procedure Laterality Date    TONSILLECTOMY         PTA Medications   Medication Sig    butalbital-acetaminophen-caffeine -40 mg (FIORICET, ESGIC) -40 mg per tablet Take 2 tablets by mouth every 6 (six) hours as needed for Pain.    PRENATAL VITS15/IRON/FOLIC/DSS (PRENATAL AD ORAL) Take 1 tablet by mouth once daily.       Review of patient's allergies indicates:  No Known Allergies     Family History     Problem Relation (Age of Onset)    Hearing loss Other    Hepatitis Father    Hypertension Father        Social History Main Topics    Smoking status: Former Smoker     Packs/day: 0.00    Smokeless tobacco: Former User     Quit date: 1/10/2017    Alcohol use No    Drug use: No    Sexual activity: Yes     Partners: Male     Birth control/ protection: None     Review of Systems   Constitutional: Negative.    HENT: Negative.    Eyes: Negative.    Respiratory: Negative.    Cardiovascular: Negative.    Gastrointestinal: Negative.    Endocrine: Negative.    Genitourinary: Negative.    Musculoskeletal: Negative.    Skin:  Negative.   Neurological: Negative.    Hematological: Negative.    Psychiatric/Behavioral: Negative.    Breast: negative.    All other systems reviewed and are negative.     Objective:     Vital  Signs (Most Recent):    Vital Signs (24h Range):           There is no height or weight on file to calculate BMI.    FHT: 138Cat 1 (reassuring)  TOCO:  Q 10 minutes    Physical Exam:   Constitutional: She is oriented to person, place, and time. She appears well-developed and well-nourished.     Eyes: Conjunctivae are normal. Pupils are equal, round, and reactive to light.    Neck: Normal range of motion.    Cardiovascular: Normal rate and regular rhythm.     Pulmonary/Chest: Breath sounds normal.        Abdominal: Soft.     Genitourinary: Vagina normal and uterus normal.           Musculoskeletal: Normal range of motion and moves all extremeties.       Neurological: She is alert and oriented to person, place, and time.    Skin: Skin is warm.    Psychiatric: She has a normal mood and affect. Her behavior is normal. Thought content normal.       Cervix:  Dilation:  4  Effacement:  100%  Station: -1  Presentation: Vertex     Significant Labs:  Lab Results   Component Value Date    GROUPTRH A POS 01/05/2017    HEPBSAG Negative 01/05/2017    STREPBCULT No Group B Streptococcus isolated 07/05/2017       I have personallly reviewed all pertinent lab results from the last 24 hours.

## 2017-08-02 NOTE — PROGRESS NOTES
S contractions stronger  O cx 5/90/-1  Cat 1 FHT's  Contractions q 2-3/60/mod  A active phase labor  P admit for delivery

## 2017-08-03 PROCEDURE — 25000003 PHARM REV CODE 250: Performed by: ADVANCED PRACTICE MIDWIFE

## 2017-08-03 PROCEDURE — 11000001 HC ACUTE MED/SURG PRIVATE ROOM

## 2017-08-03 PROCEDURE — 99231 SBSQ HOSP IP/OBS SF/LOW 25: CPT | Mod: ,,, | Performed by: ADVANCED PRACTICE MIDWIFE

## 2017-08-03 RX ADMIN — DIPHENHYDRAMINE HYDROCHLORIDE 25 MG: 25 CAPSULE ORAL at 09:08

## 2017-08-03 RX ADMIN — IBUPROFEN 600 MG: 600 TABLET, FILM COATED ORAL at 06:08

## 2017-08-03 RX ADMIN — IBUPROFEN 600 MG: 600 TABLET, FILM COATED ORAL at 01:08

## 2017-08-03 RX ADMIN — OXYCODONE HYDROCHLORIDE AND ACETAMINOPHEN 1 TABLET: 10; 325 TABLET ORAL at 09:08

## 2017-08-03 RX ADMIN — OXYCODONE HYDROCHLORIDE AND ACETAMINOPHEN 1 TABLET: 5; 325 TABLET ORAL at 03:08

## 2017-08-03 RX ADMIN — OXYCODONE HYDROCHLORIDE AND ACETAMINOPHEN 1 TABLET: 10; 325 TABLET ORAL at 08:08

## 2017-08-03 RX ADMIN — DOCUSATE SODIUM 100 MG: 100 CAPSULE, LIQUID FILLED ORAL at 09:08

## 2017-08-03 RX ADMIN — IBUPROFEN 600 MG: 600 TABLET, FILM COATED ORAL at 11:08

## 2017-08-03 RX ADMIN — OXYCODONE HYDROCHLORIDE AND ACETAMINOPHEN 1 TABLET: 5; 325 TABLET ORAL at 04:08

## 2017-08-03 RX ADMIN — DIPHENHYDRAMINE HYDROCHLORIDE 25 MG: 25 CAPSULE ORAL at 03:08

## 2017-08-03 NOTE — PROGRESS NOTES
[facility] -   Obstetrics  Postpartum Progress Note    Patient Name: Donna Kwong  MRN: [id number]  Admission Date: 2017  Hospital Length of Stay: 1 days  Attending Physician: STEPHANIE Murray MD  Primary Care Provider: Marii Aguilar MD    Subjective:     Principal Problem: (normal spontaneous vaginal delivery)    Hospital course: PPD1 vaginal delivery of viable male infant   Breastfeeding and pumping    Interval History:     She is doing well this morning. She is tolerating a regular diet without nausea or vomiting. She is voiding spontaneously. She is ambulating.Vaginal bleeding is mild. She denies fever or chills. Abdominal pain is mild and controlled with oral medications. She is breastfeeding.     Objective:     Vital Signs (Most Recent):  Temp: 97.8 °F (36.6 °C) (17 0800)  Pulse: 66 (17 0800)  Resp: 18 (17 0800)  BP: (!) 108/56 (17 0800)  SpO2: 100 % (17) Vital Signs (24h Range):  Temp:  [97.4 °F (36.3 °C)-98.5 °F (36.9 °C)] 97.8 °F (36.6 °C)  Pulse:  [66-92] 66  Resp:  [16-18] 18  SpO2:  [99 %-100 %] 100 %  BP: ()/(49-87) 108/56     Weight: 88.2 kg (194 lb 7 oz)  Body mass index is 27.9 kg/m².      Intake/Output Summary (Last 24 hours) at 17 09  Last data filed at 17 0800   Gross per 24 hour   Intake                0 ml   Output             2500 ml   Net            -2500 ml       Significant Labs:  Lab Results   Component Value Date    GROUPTRH A POS 2017    HEPBSAG Negative 2017    STREPBCULT No Group B Streptococcus isolated 2017       Recent Labs  Lab 17  1710   HGB 12.0   HCT 33.8*       CBC:   Recent Labs  Lab 17  1710   WBC 13.09*   RBC 3.72*   HGB 12.0   HCT 33.8*   *   MCV 91   MCH 32.3*   MCHC 35.5     I have personallly reviewed all pertinent lab results from the last 24 hours.    Physical Exam:   Constitutional: She is oriented to person, place, and time. She appears  well-developed and well-nourished.    HENT:   Head: Normocephalic.     Neck: Normal range of motion.    Cardiovascular: Normal rate, regular rhythm, normal heart sounds and intact distal pulses.     Pulmonary/Chest: Effort normal and breath sounds normal.        Abdominal: Soft.   Fundus firm -2     Genitourinary: Vagina normal and uterus normal.           Musculoskeletal: Normal range of motion and moves all extremeties.       Neurological: She is alert and oriented to person, place, and time.    Skin: Skin is warm and dry.    Psychiatric: She has a normal mood and affect.       Assessment/Plan:     26 y.o. female  for:    Clear vaginal discharge    A IUP at 39w6d  GBS negative  AROM clear  P monitor and assess  Begin pitocin augmentation            Disposition: As patient meets milestones, will plan to discharge tomorrow.    Elliot Casas CNM  Obstetrics  [facility]

## 2017-08-03 NOTE — PROGRESS NOTES
"Discussed feeding choice with mother.  Reviewed benefits of breastfeeding.  Patient given "What to Expect in the First 48 Hours" handout. Mother states her intention is breastfeed.  "

## 2017-08-03 NOTE — PROGRESS NOTES
[facility]  Obstetrics  Labor Progress Note    Patient Name: Donna Kwong  MRN: [id number]  Admission Date: 2017  Hospital Length of Stay: 0 days  Attending Physician: STEPHANIE Murray MD  Primary Care Provider: Marii Aguilar MD    Subjective:     Principal Problem:<principal problem not specified>    Hospital Course:  Sterile speculum exam negative nitrazine, negative pool  cx 4/100/-1  Epidural placed for comfort  Begin pitocin augmentation    Interval History:  Donna is a 26 y.o.  at 39w6d. She is doing well. Comfortable with epidural    Objective:     Vital Signs (Most Recent):  Temp: 98.4 °F (36.9 °C) (17 1850)  Pulse: 72 (17 1850)  Resp: 17 (17 1850)  BP: 100/64 (17 1900)  SpO2: 99 % (17 1840) Vital Signs (24h Range):  Temp:  [98.3 °F (36.8 °C)-98.4 °F (36.9 °C)] 98.4 °F (36.9 °C)  Pulse:  [68-80] 72  Resp:  [16-17] 17  SpO2:  [99 %] 99 %  BP: (100-120)/(49-71) 100/64     Weight: 88.2 kg (194 lb 7 oz)  Body mass index is 27.9 kg/m².    FHT: 144Cat 1 (reassuring)  TOCO:  Q 2-4 minutes    Cervical Exam:  Dilation:  5  Effacement:  100%  Station: -1  Presentation: Vertex     Significant Labs:  Lab Results   Component Value Date    GROUPTRH A POS 2017    HEPBSAG Negative 2017    STREPBCULT No Group B Streptococcus isolated 2017       I have personallly reviewed all pertinent lab results from the last 24 hours.    Physical Exam    Assessment/Plan:     26 y.o. female  at 39w6d for:    Clear vaginal discharge    A IUP at 39w6d  GBS negative  AROM clear  P monitor and assess  Begin pitocin augmentation              Taylor Tanner CNM  Obstetrics  [facility]

## 2017-08-03 NOTE — LACTATION NOTE
"Lactation called to room to assist with feeding. Mother has infant to the right breast in the football position, infant curled around breast with chin on chest & not able to latch. Assisted mother with position changes to put infant in the natural drinking/sniffing position. Assisted mother to latch infant deeply to the breast, mothers body tenses up & she holds her breath rates pain 7/10, after 30 seconds she relaxes & rates pain 5/10 that remains for duration of feeding. Despite proper position & latch, infants cheeks dimple. Occasional clicking noted, mother reports more pain with clicking. Swallows audible. Nipple shape slightly compressed upon unlatching, color WNL. Infant unlatches, mother burps infant to re arouse to continue to feed.    As mother is burping infant she states, "His tongue is weird shaped, like an M. Why does he have a lizard shaped tongue?" Infant noted to have a dimple in center of tongue tip, speed bump felt with finger sweep, tight band of tissue under tongue visible. Suck assessment reveals infant is not moving tongue in peristaltic motion, but instead moving tongue up & down. Discussed with mother that she needs to discuss with pediatrician the shape & mobility of his tongue. Discussed breastfeeding may or may not be impacted by tight lingual frenulum, only symptom at this time is maternal pain. Discussed that if pain increases or does not fully resolve within one week, she would need to follow up with outpatient lactation & pediatrician. Mother verbalizes understanding of information.    Infant begins showing feeding cues again. Mother places infant to the right breast in the correct football position and deeply latches infant independently. No change in maternal pain. Swallows remain audible. No change in nipple shape or color than previous latch. Infant feeds until content & mother unlatches infant.    Mother states she has been pumping after feeds and syringe feeding EBM by choice. " "Discussed signs of adequate transfer. Encouraged mother to lessen her burden by hand expressing and syringe feeding EBM as desired & not pumping. Hand expression technique reviewed, mother able to independently return demo & collect EBM. Syringe feeding technique verbally reviewed. Proper EBM handling, collection & storage discussed. Mother states she may prefer to pump. Proper use of breast pump reviewed as well as proper cleaning of breast pump kit. Mother verbalizes understanding.    Lactation to re-evaluate maternal pain with feeds tomorrow. Mother to request further lactation assistance as needed or desired.        08/03/17 0910   Maternal Infant Assessment   Breast Shape Bilateral:;round   Breast Density Bilateral:;soft   Areola Bilateral:;elastic   Nipple(s) Bilateral:;everted  (left dimpled in center)   Infant Assessment   Tongue/Frenulum Symptoms frenulum tight;moist;intact;pink   Gum Symptoms moist   Sucking Reflex present   Rooting Reflex present   Swallow Reflex present   Number of Stools (24 hours) 3   Number of Voids (24 hours) 2   LATCH Score   Latch 2-->grasps breast, tongue down, lips flanged, rhythmic sucking   Audible Swallowing 2-->spontaneous and intermittent (24 hrs old)   Type Of Nipple 2-->everted (after stimulation)   Comfort (Breast/Nipple) 1-->filling, red/small blisters/bruises, mild/mod discomfort   Hold (Positioning) 1-->minimal assist, teach one side: mother does other, staff holds   Score (less than 7 for 2/more consecutive times, consult Lactation Consultant) 8   Maternal Infant Feeding   Maternal Emotional State relaxed   Infant Positioning clutch/"football"  (right breast)   Signs of Milk Transfer audible swallow;infant jaw motion present   Presence of Pain yes   Pain Location nipple, right   Pain Description other (see comments)  (biting )   Comfort Measures Before/During Feeding infant position adjusted;latch adjusted;maternal position adjusted;suction broken using finger "   Comfort Measures Following Feeding expressed milk applied;air-drying encouraged   Nipple Shape After Feeding, Right slight compression   Latch Assistance yes   Breastfeeding Education milk expression, hand;milk expression, electric pump;importance of skin-to-skin contact;adequate milk volume;adequate infant intake;label/storage of breast milk   Feeding Infant   Feeding Readiness Cues rooting   Satiety Cues infant's body extended;cessation of sucking   Effective Latch During Feeding yes   Audible Swallow yes   Equipment Type/Education   Pump Type Symphony   Breast Pump Type double electric, hospital grade   Breast Pump Flange Type hard   Lactation Referrals   Lactation Referrals pediatric care provider   Lactation Interventions   Attachment Promotion counseling provided;breastfeeding assistance provided;infant-mother separation minimized;rooming-in promoted;skin-to-skin contact encouraged   Breast Care: Breastfeeding milk massaged towards nipple   Breastfeeding Assistance assisted with positioning;both breasts offered each feeding;feeding cue recognition promoted;feeding on demand promoted;feeding session observed;infant latch-on verified;infant suck/swallow verified;support offered   Maternal Breastfeeding Support lactation counseling provided;encouragement offered   Latch Promotion positioning assisted;infant moved to breast;suck stimulated with colostrum drop

## 2017-08-03 NOTE — ANESTHESIA POSTPROCEDURE EVALUATION
"Anesthesia Post Evaluation    Patient: Donna Kwong    Procedure(s) Performed: * No procedures listed *    Final Anesthesia Type: epidural  Patient location during evaluation: labor & delivery  Patient participation: Yes- Able to Participate  Level of consciousness: awake and alert  Post-procedure vital signs: reviewed and stable  Pain management: adequate  Airway patency: patent  PONV status at discharge: No PONV  Anesthetic complications: no      Cardiovascular status: blood pressure returned to baseline  Respiratory status: unassisted  Hydration status: euvolemic  Follow-up not needed.        Visit Vitals  /61   Pulse 91   Temp 36.7 °C (98.1 °F) (Axillary)   Resp 18   Ht 5' 10" (1.778 m)   Wt 88.2 kg (194 lb 7 oz)   SpO2 100%   Breastfeeding? Unknown   BMI 27.90 kg/m²       Pain/Pia Score: Pain Rating Prior to Med Admin: 6 (8/3/2017  4:07 AM)  Pia Score: 9 (8/2/2017 10:15 PM)      "

## 2017-08-03 NOTE — PLAN OF CARE
Problem: Patient Care Overview  Goal: Plan of Care Review  Outcome: Ongoing (interventions implemented as appropriate)  Pt had no falls and was afebrile this shift. Uterus firm w/out massage; midline after voiding. Bleeding light and no clots this shift. Pain well controlled with medication. Breastfeeding, prefers to pump. Attempted hand expression and stated she did not want to hand express anymore and just wanted to pump. VSS at this time. Will continue to monitor.

## 2017-08-03 NOTE — SUBJECTIVE & OBJECTIVE
Hospital course: PPD1 vaginal delivery of viable male infant   Breastfeeding and pumping    Interval History:     She is doing well this morning. She is tolerating a regular diet without nausea or vomiting. She is voiding spontaneously. She is ambulating.Vaginal bleeding is mild. She denies fever or chills. Abdominal pain is mild and controlled with oral medications. She is breastfeeding.     Objective:     Vital Signs (Most Recent):  Temp: 97.8 °F (36.6 °C) (08/03/17 0800)  Pulse: 66 (08/03/17 0800)  Resp: 18 (08/03/17 0800)  BP: (!) 108/56 (08/03/17 0800)  SpO2: 100 % (08/02/17 2209) Vital Signs (24h Range):  Temp:  [97.4 °F (36.3 °C)-98.5 °F (36.9 °C)] 97.8 °F (36.6 °C)  Pulse:  [66-92] 66  Resp:  [16-18] 18  SpO2:  [99 %-100 %] 100 %  BP: ()/(49-87) 108/56     Weight: 88.2 kg (194 lb 7 oz)  Body mass index is 27.9 kg/m².      Intake/Output Summary (Last 24 hours) at 08/03/17 0911  Last data filed at 08/03/17 0800   Gross per 24 hour   Intake                0 ml   Output             2500 ml   Net            -2500 ml       Significant Labs:  Lab Results   Component Value Date    GROUPTRH A POS 08/02/2017    HEPBSAG Negative 01/05/2017    STREPBCULT No Group B Streptococcus isolated 07/05/2017       Recent Labs  Lab 08/02/17  1710   HGB 12.0   HCT 33.8*       CBC:   Recent Labs  Lab 08/02/17  1710   WBC 13.09*   RBC 3.72*   HGB 12.0   HCT 33.8*   *   MCV 91   MCH 32.3*   MCHC 35.5     I have personallly reviewed all pertinent lab results from the last 24 hours.    Physical Exam:   Constitutional: She is oriented to person, place, and time. She appears well-developed and well-nourished.    HENT:   Head: Normocephalic.     Neck: Normal range of motion.    Cardiovascular: Normal rate, regular rhythm, normal heart sounds and intact distal pulses.     Pulmonary/Chest: Effort normal and breath sounds normal.        Abdominal: Soft.   Fundus firm -2     Genitourinary: Vagina normal and uterus normal.            Musculoskeletal: Normal range of motion and moves all extremeties.       Neurological: She is alert and oriented to person, place, and time.    Skin: Skin is warm and dry.    Psychiatric: She has a normal mood and affect.

## 2017-08-03 NOTE — TRANSFER OF CARE
"Anesthesia Transfer of Care Note    Patient: Donna Kwong    Procedure(s) Performed: * No procedures listed *    Patient location: Labor and Delivery    Anesthesia Type: epidural    Post pain: adequate analgesia    Post vital signs: stable    Level of consciousness: awake    Nausea/Vomiting: no nausea/vomiting    Complications: none    Transfer of care protocol was followed      Last vitals:   Visit Vitals  /61   Pulse 91   Temp 36.7 °C (98.1 °F) (Axillary)   Resp 18   Ht 5' 10" (1.778 m)   Wt 88.2 kg (194 lb 7 oz)   SpO2 100%   Breastfeeding? Unknown   BMI 27.90 kg/m²     "

## 2017-08-03 NOTE — LACTATION NOTE
Lactation Rounds: infant output WNL. Mother states that infant is latching to tip of the nipple only, not latching deeply so she has been feeding infant at the breast, pumping & feeding EBM via syringe. Encouraged mother to call for latch assessment and feeding assistance when infant next shows signs of hunger to assist with deep asymmetric latch in hopes to discontinue pumping and supplementing.     Lactation packet given and admit information reviewed. Mother verbalizes understanding of expected  behaviors and output for the first 48 hours of life.  Discussed the importance of cue based feedings on demand, unrestricted access to the breast, and frequent uninterrupted skin to skin contact.  Risk and implications of artificial nipples and supplementation discussed.  Encouraged mother to call for assistance when desired or when infant is showing signs of hunger, contact number provided, mother verbalizes understanding.     17 0800   Infant Information   Infant's Name Gurwinder   Maternal Infant Feeding   Maternal Emotional State relaxed   Breastfeeding Education adequate infant intake;adequate milk volume;importance of skin-to-skin contact   Lactation Interventions   Attachment Promotion counseling provided;family involvement promoted;infant-mother separation minimized;rooming-in promoted;skin-to-skin contact encouraged   Breastfeeding Assistance both breasts offered each feeding;feeding cue recognition promoted;feeding on demand promoted;support offered   Maternal Breastfeeding Support lactation counseling provided;encouragement offered

## 2017-08-03 NOTE — L&D DELIVERY NOTE
of male infant in OA position over intact perineum  Infant bulb suctioned,nucal cord reduced over shoulder,  Shoulders delivered with ease, infant to maternal abdomen  Cord clamped and cut,Placenta delivered with gentle traction,  Inspection reveals no lacerations, EBL 200cc   Delivery Information for  Fidel Kwong    Birth information:  YOB: 2015   Time of birth: 9:42 PM   Sex: male   Head Delivery Date/Time: 2017  9:41 PM   Delivery type: Vaginal, Spontaneous Delivery   Gestational Age: 39w6d    Delivery Providers    Delivering clinician:  Taylor Tanner CNM   Other personnel:   Provider Role   KATHI Ross, KATHI                     Assessment    Living status:  Living  Apgars:     1 Minute:   5 Minute:   10 Minute:   15 Minute:   20 Minute:     Skin Color:   0  1       Heart Rate:   2  2       Reflex Irritability:   2  2       Muscle Tone:   2  2       Respiratory Effort:   2  2       Total:   8  9               Apgars Assigned By:  RONALD WERNER RN                   Presentation and Position    Presentation:   Vertex   Position:   Middle    Occiput    Anterior            Interventions/Resuscitation    Method:  Bulb Suctioning, Tactile Stimulation       Cord    Vessels:  3 vessels  Complications:  Nuchal  Nuchal Intervention:  reduced  Nuchal Cord Description:  loose nuchal cord  Number of Loops:  1  Delayed Cord Clamping?:  Yes  Cord Clamped Date/Time:  2017  9:44 PM  Cord Blood Disposition:  Lab  Gases Sent?:  No  Stem Cell Collection (by MD):  No       Placenta    Date and time:  2017  9:47 PM  Removal:  Spontaneous  Appearance:  Intact  Placenta disposition:  discarded           Labor Events:       labor:       Labor Onset Date/Time:         Dilation Complete Date/Time:         Start Pushing Date/Time:       Rupture Date/Time:              Rupture type: intact         Fluid Amount:        Fluid  Color:        Fluid Odor:        Membrane Status (PeriCalm): ARM (Artificial Rupture)      Rupture Date/Time (PeriCalm): 2017 20:05:00      Fluid Amount (PeriCalm): Small      Fluid Color (PeriCalm): Clear       steroids:       Antibiotics given for GBS:       Induction:       Indications for induction:        Augmentation:       Indications for augmentation:       Labor complications:       Additional complications:          Cervical ripening:                     Delivery:      Episiotomy:       Indication for Episiotomy:       Perineal Lacerations:   Repaired:      Periurethral Laceration:   Repaired:     Labial Laceration:   Repaired:     Sulcus Laceration:   Repaired:     Vaginal Laceration:   Repaired:     Cervical Laceration:   Repaired:     Repair suture:       Repair # of packets:       Vaginal delivery QBL (mL):        QBL (mL): 0     Combined Blood Loss (mL): 0     Vaginal Sweep Performed:       Surgicount Correct:         Other providers:       Anesthesia    Method:  Epidural          Details (if applicable):  Trial of Labor      Categorization:      Priority:     Indications for :     Incision Type:       Additional  information:  Forceps:    Vacuum:    Breech:    Observed anomalies    Other (Comments):

## 2017-08-03 NOTE — SUBJECTIVE & OBJECTIVE
Interval History:  Donna is a 26 y.o.  at 39w6d. She is doing well. Comfortable with epidural    Objective:     Vital Signs (Most Recent):  Temp: 98.4 °F (36.9 °C) (17 1850)  Pulse: 72 (17)  Resp: 17 (17)  BP: 100/64 (17 1900)  SpO2: 99 % (17 184) Vital Signs (24h Range):  Temp:  [98.3 °F (36.8 °C)-98.4 °F (36.9 °C)] 98.4 °F (36.9 °C)  Pulse:  [68-80] 72  Resp:  [16-17] 17  SpO2:  [99 %] 99 %  BP: (100-120)/(49-71) 100/64     Weight: 88.2 kg (194 lb 7 oz)  Body mass index is 27.9 kg/m².    FHT: 144Cat 1 (reassuring)  TOCO:  Q 2-4 minutes    Cervical Exam:  Dilation:  5  Effacement:  100%  Station: -1  Presentation: Vertex     Significant Labs:  Lab Results   Component Value Date    GROUPTRH A POS 2017    HEPBSAG Negative 2017    STREPBCULT No Group B Streptococcus isolated 2017       I have personallly reviewed all pertinent lab results from the last 24 hours.    Physical Exam

## 2017-08-03 NOTE — PLAN OF CARE
Problem: Patient Care Overview  Goal: Plan of Care Review  Outcome: Ongoing (interventions implemented as appropriate)  Pt VSS, ambulated to BR well, voided 1000cc. Bleeding light/ moderate.

## 2017-08-03 NOTE — ANESTHESIA RELEASE NOTE
"Anesthesia Release from PACU Note    Patient: Donna Kwong    Procedure(s) Performed: * No procedures listed *    Anesthesia type: epidural    Post pain: Adequate analgesia    Post assessment: no apparent anesthetic complications    Last Vitals:   Visit Vitals  /61   Pulse 91   Temp 36.7 °C (98.1 °F) (Axillary)   Resp 18   Ht 5' 10" (1.778 m)   Wt 88.2 kg (194 lb 7 oz)   SpO2 100%   Breastfeeding? Unknown   BMI 27.90 kg/m²       Post vital signs: stable    Level of consciousness: awake and alert     Nausea/Vomiting: no nausea/no vomiting    Complications: none    Airway Patency: patent    Respiratory: unassisted, spontaneous ventilation, room air    Cardiovascular: stable and blood pressure at baseline    Hydration: euvolemic  "

## 2017-08-04 VITALS
SYSTOLIC BLOOD PRESSURE: 109 MMHG | BODY MASS INDEX: 27.84 KG/M2 | WEIGHT: 194.44 LBS | RESPIRATION RATE: 18 BRPM | DIASTOLIC BLOOD PRESSURE: 58 MMHG | OXYGEN SATURATION: 100 % | HEIGHT: 70 IN | TEMPERATURE: 98 F | HEART RATE: 68 BPM

## 2017-08-04 PROCEDURE — 99238 HOSP IP/OBS DSCHRG MGMT 30/<: CPT | Mod: ,,, | Performed by: ADVANCED PRACTICE MIDWIFE

## 2017-08-04 PROCEDURE — 25000003 PHARM REV CODE 250: Performed by: ADVANCED PRACTICE MIDWIFE

## 2017-08-04 RX ADMIN — IBUPROFEN 600 MG: 600 TABLET, FILM COATED ORAL at 11:08

## 2017-08-04 RX ADMIN — IBUPROFEN 600 MG: 600 TABLET, FILM COATED ORAL at 06:08

## 2017-08-04 RX ADMIN — DOCUSATE SODIUM 100 MG: 100 CAPSULE, LIQUID FILLED ORAL at 08:08

## 2017-08-04 NOTE — PLAN OF CARE
Discharge instructions given to patient.  Verbalized understanding.  D/c'ed per w/c with infant on lap.

## 2017-08-04 NOTE — SUBJECTIVE & OBJECTIVE
Interval History:  Donna is a 26 y.o.  at 39w6d. She is doing well.     Objective:     Vital Signs (Most Recent):  Temp: 97.7 °F (36.5 °C) (17 0800)  Pulse: 62 (17 0800)  Resp: (!) 181 (17 0800)  BP: (!) 109/58 (17 0800)  SpO2: 100 % (17 2209) Vital Signs (24h Range):  Temp:  [97.7 °F (36.5 °C)-98.3 °F (36.8 °C)] 97.7 °F (36.5 °C)  Pulse:  [55-64] 62  Resp:  [] 181  BP: (101-111)/(49-68) 109/58     Weight: 88.2 kg (194 lb 7 oz)  Body mass index is 27.9 kg/m².        Cervical Exam:  Dilation:    Effacement:    Station:   Presentatio     Significant Labs:  Lab Results   Component Value Date    GROUPTRH A POS 2017    HEPBSAG Negative 2017    STREPBCULT No Group B Streptococcus isolated 2017       I have personallly reviewed all pertinent lab results from the last 24 hours.    Physical Exam:   Constitutional: She is oriented to person, place, and time. She appears well-developed and well-nourished.     Eyes: Conjunctivae are normal. Pupils are equal, round, and reactive to light.    Neck: Normal range of motion.    Cardiovascular: Normal rate and regular rhythm.     Pulmonary/Chest: Breath sounds normal.        Abdominal: Soft.     Genitourinary: Vagina normal and uterus normal.           Musculoskeletal: Normal range of motion and moves all extremeties.       Neurological: She is alert and oriented to person, place, and time.    Skin: Skin is warm.    Psychiatric: She has a normal mood and affect. Her behavior is normal. Thought content normal.

## 2017-08-04 NOTE — DISCHARGE INSTRUCTIONS
"Mother Self Care:    Activity: Avoid strenuous exercise and get adequate rest.  No driving until the physician consent given.  Emotional Changes: Most women find birth to be a time of great emotional upheaval.  Sense of loss, mood swings, fatigue, anxiety, and feeling "let down" are common.  If feelings worsen or last more than a week, call your physician.  Breast Care/Breastfeeding: Wear a bra for comfort.  Keep nipples dry and apply your own breast milk or lanolin cream as needed for soreness.  Engorgement can be relieved with warm, moist heat before feedings.  You may also take Ibuprofen.  Breast Care/Bottle Feeding: Wear support bra 24 hours a day for one week.  Avoid stimulation to breasts.  You may use ice packs for discomfort.  Lacey-Care/Vaginal Bleeding: Remember to use your lacey-bottle after urinating.  Your flow will change from red, to pink, to yellow/white color over a period of 2 weeks.  Menstruation will return in 3-8 weeks, or longer if breastfeeding.  Episiotomy Vaginal Delivery: Stitches will dissolve within 10 days to 3 weeks.  Warm baths, tucks, and dermoplast will promote healing.  Avoid bubble baths or strong soaps.   Section/Tubal Ligation: Keep incision clean and dry.  Please remove steri-strips in 5-7 days.  You may shower, but avoid baths.  Sexual Activity/Pelvic Rest: No sexual activity, tampons, or douching until your physician gives you consent.  Diet: Continue to eat from the five basic food groups, including plenty of protein, fruits, vegetables, and whole grains.  Limit empty calories and high fat foods.  Drink enough fluids to satisfy thirst and add an extra 500 calories for breastfeeding.  Constipation/Hemorrhoids: Drink plenty of water.  You may take a stool softener or natural laxative (Metamucil). You may use tucks or hemorrhoid ointment and soak in a warm tub.    CALL YOUR OB DOCTOR IF ANY OF THE FOLLOWING OCCURS:  *Heavy bleeding - saturating a pad an hour or passing any " large (2-3 inches in size) blood clots.  *Any pain, redness, or tenderness in lower leg.  *You cannot care for yourself or your baby.  *Any signs of infection-      - Temperature greater than 100.5 degrees F      - Foul smelling vaginal discharge and/or incisional drainage      - Increased episiotomy or incisional pain      - Hot, hard, red or sore area on breast      - Flu-like symptoms      - Any urgency, frequency or burning with urination

## 2017-08-04 NOTE — SUBJECTIVE & OBJECTIVE
Hospital course: PPD1 vaginal delivery of viable male infant   Breastfeeding and pumping  Ready for discharge    Interval History:     She is doing well this morning. She is tolerating a regular diet without nausea or vomiting. She is voiding spontaneously. She is ambulating. She has passed flatus, and has a BM. Vaginal bleeding is mild. She denies fever or chills. Abdominal pain is mild and controlled with oral medications. She is breastfeeding. She desires circumcision for her male baby: yes.    Objective:     Vital Signs (Most Recent):  Temp: 97.7 °F (36.5 °C) (08/04/17 0800)  Pulse: 62 (08/04/17 0800)  Resp: (!) 181 (08/04/17 0800)  BP: (!) 109/58 (08/04/17 0800)  SpO2: 100 % (08/02/17 2209) Vital Signs (24h Range):  Temp:  [97.7 °F (36.5 °C)-98.3 °F (36.8 °C)] 97.7 °F (36.5 °C)  Pulse:  [55-64] 62  Resp:  [] 181  BP: (101-111)/(49-68) 109/58     Weight: 88.2 kg (194 lb 7 oz)  Body mass index is 27.9 kg/m².    No intake or output data in the 24 hours ending 08/04/17 1054    Significant Labs:  Lab Results   Component Value Date    GROUPTRH A POS 08/02/2017    HEPBSAG Negative 01/05/2017    STREPBCULT No Group B Streptococcus isolated 07/05/2017       Recent Labs  Lab 08/02/17  1710   HGB 12.0   HCT 33.8*       I have personallly reviewed all pertinent lab results from the last 24 hours.    Physical Exam:   Constitutional: She is oriented to person, place, and time. She appears well-developed and well-nourished.     Eyes: Conjunctivae are normal. Pupils are equal, round, and reactive to light.    Neck: Normal range of motion.    Cardiovascular: Normal rate and regular rhythm.     Pulmonary/Chest: Breath sounds normal.        Abdominal: Soft.     Genitourinary: Vagina normal and uterus normal.           Musculoskeletal: Normal range of motion and moves all extremeties.       Neurological: She is alert and oriented to person, place, and time.    Skin: Skin is warm.    Psychiatric: She has a normal mood and  affect. Her behavior is normal. Thought content normal.

## 2017-08-04 NOTE — LACTATION NOTE
"Lactation Rounds    Weight loss -5%. 8 voids and 6 stools. Upon arrival mother states " latch is not working, the doctor needs to cut the bottom of the tongue, latch is bareable but still hurts. My plan is to pump and give EBM" Mother states she had no plans to put infant back to breast and denies help with latching at this time. MOther has been syringe feeding and supplementing infant with formula. Mother educated on not syringe feeding past day 5 large amounts of milk. Mother chooses to use artifical nipple for next feeding before going home. Mother encouraged to use breast milk first, and to pump 8-12 times every 24 hours. Mother verbalized understanding. Mother does not have double electric breast pump at home. Mother wants to use a hand pump. Explained,educated regarding impact of milk supply and not encouraged for long term use. Mother given options to rent breast pump from hospital and to call [facility] to get a breast pump. Mother to let primary nurse Mari her decision regarding pump. Mother denies any questions or concerns at this time and feels comfortable with her plan to pump and given EBM going home. Primary nurse updated.     Lactation discharge information reviewed.  Mother is aware of warm line, and outpatient consultations and monthly support gatherings. Encouraged mother to contact lactation with any questions, concerns, or problems. Contact numbers provided, and mother verbalizes understanding.      "

## 2017-08-04 NOTE — PLAN OF CARE
Problem: Patient Care Overview  Goal: Plan of Care Review  Outcome: Ongoing (interventions implemented as appropriate)  Pt progressing well. Up ad valdo and voiding without difficulty. Pain controlled with po meds. Vitals stable. Bonding with baby.

## 2017-08-04 NOTE — PROGRESS NOTES
[facility] -   Obstetrics  Postpartum Progress Note    Patient Name: Donna Kwong  MRN: [id number]  Admission Date: 2017  Hospital Length of Stay: 2 days  Attending Physician: STEPHANIE Murray MD  Primary Care Provider: Marii Aguilar MD    Subjective:     Principal Problem: (normal spontaneous vaginal delivery)    Hospital course: PPD1 vaginal delivery of viable male infant   Breastfeeding and pumping  Ready for discharge    Interval History:     She is doing well this morning. She is tolerating a regular diet without nausea or vomiting. She is voiding spontaneously. She is ambulating. She has passed flatus, and has a BM. Vaginal bleeding is mild. She denies fever or chills. Abdominal pain is mild and controlled with oral medications. She is breastfeeding. She desires circumcision for her male baby: yes.    Objective:     Vital Signs (Most Recent):  Temp: 97.7 °F (36.5 °C) (17 0800)  Pulse: 62 (17 0800)  Resp: (!) 181 (17 0800)  BP: (!) 109/58 (17 0800)  SpO2: 100 % (17 2209) Vital Signs (24h Range):  Temp:  [97.7 °F (36.5 °C)-98.3 °F (36.8 °C)] 97.7 °F (36.5 °C)  Pulse:  [55-64] 62  Resp:  [] 181  BP: (101-111)/(49-68) 109/58     Weight: 88.2 kg (194 lb 7 oz)  Body mass index is 27.9 kg/m².    No intake or output data in the 24 hours ending 17 1054    Significant Labs:  Lab Results   Component Value Date    GROUPTRH A POS 2017    HEPBSAG Negative 2017    STREPBCULT No Group B Streptococcus isolated 2017       Recent Labs  Lab 17  1710   HGB 12.0   HCT 33.8*       I have personallly reviewed all pertinent lab results from the last 24 hours.    Physical Exam:   Constitutional: She is oriented to person, place, and time. She appears well-developed and well-nourished.     Eyes: Conjunctivae are normal. Pupils are equal, round, and reactive to light.    Neck: Normal range of motion.    Cardiovascular: Normal rate and  regular rhythm.     Pulmonary/Chest: Breath sounds normal.        Abdominal: Soft.     Genitourinary: Vagina normal and uterus normal.           Musculoskeletal: Normal range of motion and moves all extremeties.       Neurological: She is alert and oriented to person, place, and time.    Skin: Skin is warm.    Psychiatric: She has a normal mood and affect. Her behavior is normal. Thought content normal.       Assessment/Plan:     26 y.o. female  for:    Clear vaginal discharge    A IUP at 39w6d  GBS negative  AROM clear  P monitor and assess  Begin pitocin augmentation        *  (normal spontaneous vaginal delivery)    Routine pp care  Pumping for baby  Desires mirena pp  P discharge home   Follow up 4 weeks            Disposition: As patient meets milestones, will plan to discharge today    Taylor Tanner CNM  Obstetrics  [facility]

## 2017-08-04 NOTE — PROGRESS NOTES
[facility]  Obstetrics  Labor Progress Note    Patient Name: Donna Kwong  MRN: [id number]  Admission Date: 2017  Hospital Length of Stay: 2 days  Attending Physician: STEPHANIE Murray MD  Primary Care Provider: Marii Aguilar MD    Subjective:     Principal Problem: (normal spontaneous vaginal delivery)    Hospital Course:  PPD1 vaginal delivery of viable male infant   Breastfeeding and pumping  Ready for discharge    Interval History:  Donna is a 26 y.o.  at 39w6d. She is doing well.     Objective:     Vital Signs (Most Recent):  Temp: 97.7 °F (36.5 °C) (17 0800)  Pulse: 62 (17 0800)  Resp: (!) 181 (17 0800)  BP: (!) 109/58 (17 0800)  SpO2: 100 % (17 2209) Vital Signs (24h Range):  Temp:  [97.7 °F (36.5 °C)-98.3 °F (36.8 °C)] 97.7 °F (36.5 °C)  Pulse:  [55-64] 62  Resp:  [] 181  BP: (101-111)/(49-68) 109/58     Weight: 88.2 kg (194 lb 7 oz)  Body mass index is 27.9 kg/m².        Cervical Exam:  Dilation:    Effacement:    [facility]     Significant Labs:  Lab Results   Component Value Date    GROUPTRH A POS 2017    HEPBSAG Negative 2017    STREPBCULT No Group B Streptococcus isolated 2017       I have personallly reviewed all pertinent lab results from the last 24 hours.    Physical Exam:   Constitutional: She is oriented to person, place, and time. She appears well-developed and well-nourished.     Eyes: Conjunctivae are normal. Pupils are equal, round, and reactive to light.    Neck: Normal range of motion.    Cardiovascular: Normal rate and regular rhythm.     Pulmonary/Chest: Breath sounds normal.        Abdominal: Soft.     Genitourinary: Vagina normal and uterus normal.           Musculoskeletal: Normal range of motion and moves all extremeties.       Neurological: She is alert and oriented to person, place, and time.    Skin: Skin is warm.    Psychiatric: She has a normal mood and affect. Her behavior  is normal. Thought content normal.       Assessment/Plan:     26 y.o. female  at 39w6d for:    Clear vaginal discharge    A IUP at 39w6d  GBS negative  AROM clear  P monitor and assess  Begin pitocin augmentation        *  (normal spontaneous vaginal delivery)    See pp progress note              Taylor Tanner CNM  Obstetrics  [facility]

## 2017-08-04 NOTE — LACTATION NOTE
08/04/17 0915   Infant Assessment   Weight Loss (%) -5   Number of Stools (24 hours) 6   Number of Voids (24 hours) 8   Maternal Infant Feeding   Maternal Emotional State relaxed;independent   Breastfeeding Education adequate milk volume;adequate infant intake;milk expression, manual pump;milk expression, hand;milk expression, electric pump;importance of skin-to-skin contact   Equipment Type/Education   Pump Type Symphony   Breast Pump Type double electric, hospital grade   Breast Pump Flange Type hard   Lactation Referrals   Lactation Consult Breastfeeding assessment;Follow up   Lactation Referrals WIC (women, infants and children) program;support group   Lactation Interventions   Attachment Promotion skin-to-skin contact encouraged;rooming-in promoted;role responsibility promoted;privacy provided;infant-mother separation minimized;family involvement promoted;face-to-face positioning promoted;environment adjusted;counseling provided   Breastfeeding Assistance support offered   Maternal Breastfeeding Support maternal rest encouraged;maternal nutrition promoted;maternal hydration promoted;lactation counseling provided;infant-mother separation minimized;encouragement offered;diary/feeding log utilized

## 2017-08-07 NOTE — SUBJECTIVE & OBJECTIVE
Obstetric HPI: for admission 17  Patient reports Frequency: Every 3 minutes contractions, active fetal movement, No vaginal bleeding , No loss of fluid     This pregnancy has been complicated by tobacco,seizure disorder    Obstetric History       T3      L3     SAB0   TAB0   Ectopic0   Multiple0   Live Births3       # Outcome Date GA Lbr Prosper/2nd Weight Sex Delivery Anes PTL Lv   4 Term 17 39w6d / 00:12 3.67 kg (8 lb 1.5 oz) M Vag-Spont EPI N CONSTANTINO      Name: ASHLEY, ANNA SMITH      Apgar1:  8                Apgar5: 9   3 2014     SAB      2 Term 13   2.722 kg (6 lb) F Vag-Spont   CONSTANTINO   1 Term 04/15/11   2.722 kg (6 lb) F Vag-Spont   CONSTANTINO        Past Medical History:   Diagnosis Date    ADHD (attention deficit hyperactivity disorder)     Seizures     Tobacco use disorder      Past Surgical History:   Procedure Laterality Date    TONSILLECTOMY         No prescriptions prior to admission.       Review of patient's allergies indicates:  No Known Allergies     Family History     Problem Relation (Age of Onset)    Hearing loss Other    Hepatitis Father    Hypertension Father        Social History Main Topics    Smoking status: Former Smoker     Packs/day: 0.00    Smokeless tobacco: Former User     Quit date: 1/10/2017    Alcohol use No    Drug use: No    Sexual activity: Yes     Partners: Male     Birth control/ protection: None     Review of Systems   Objective:     Vital Signs (Most Recent):  Temp: 98.1 °F (36.7 °C) (17 1600)  Pulse: 68 (17 1600)  Resp: 18 (17 1600)  BP: (!) 109/58 (17 0800)  SpO2: 100 % (17 2209) Vital Signs (24h Range):        Weight: 88.2 kg (194 lb 7 oz)  Body mass index is 27.9 kg/m².    FHT: 144Cat 1 (reassuring)  TOCO:  Q 2-4 minutes    Physical Exam    Cervix:  Dilation:  4  Effacement:  75%  Station: -1  Presentation: Vertex     Significant Labs:  Lab Results   Component Value Date    GROUPTRH A POS 2017    HEPBSAG  Negative 01/05/2017    STREPBCULT No Group B Streptococcus isolated 07/05/2017       I have personallly reviewed all pertinent lab results from the last 24 hours.

## 2017-08-07 NOTE — H&P
[facility] -   Obstetrics  History & Physical    Patient Name: Donna Kwong  MRN: [id number]  Admission Date: 2017  Primary Care Provider: Marii Aguilar MD    Subjective:     Principal Problem:Supervision of other normal pregnancy    History of Present Illness:  Admitted in active labor      Obstetric HPI: for admission 17  Patient reports Frequency: Every 3 minutes contractions, active fetal movement, No vaginal bleeding , No loss of fluid     This pregnancy has been complicated by tobacco,seizure disorder    Obstetric History       T3      L3     SAB0   TAB0   Ectopic0   Multiple0   Live Births3       # Outcome Date GA Lbr Prosper/2nd Weight Sex Delivery Anes PTL Lv   4 Term 17 39w6d / 00:12 3.67 kg (8 lb 1.5 oz) M Vag-Spont EPI N CONSTANTINO      Name: ANNA KWONG      Apgar1:  8                Apgar5: 9   3 2014     SAB      2 Term 13   2.722 kg (6 lb) F Vag-Spont   CONSTANTINO   1 Term 04/15/11   2.722 kg (6 lb) F Vag-Spont   CONSTANTINO        Past Medical History:   Diagnosis Date    ADHD (attention deficit hyperactivity disorder)     Seizures     Tobacco use disorder      Past Surgical History:   Procedure Laterality Date    TONSILLECTOMY         No prescriptions prior to admission.       Review of patient's allergies indicates:  No Known Allergies     Family History     Problem Relation (Age of Onset)    Hearing loss Other    Hepatitis Father    Hypertension Father        Social History Main Topics    Smoking status: Former Smoker     Packs/day: 0.00    Smokeless tobacco: Former User     Quit date: 1/10/2017    Alcohol use No    Drug use: No    Sexual activity: Yes     Partners: Male     Birth control/ protection: None     Review of Systems   Objective:     Vital Signs (Most Recent):  Temp: 98.1 °F (36.7 °C) (17 1600)  Pulse: 68 (17 1600)  Resp: 18 (17 1600)  BP: (!) 109/58 (17 0800)  SpO2: 100 % (17 2209) Vital Signs (24h Range):         Weight: 88.2 kg (194 lb 7 oz)  Body mass index is 27.9 kg/m².    FHT: 144Cat 1 (reassuring)  TOCO:  Q 2-4 minutes    Physical Exam    Cervix:  Dilation:  4  Effacement:  75%  Station: -1  Presentation: Vertex     Significant Labs:  Lab Results   Component Value Date    GROUPTRH A POS 2017    HEPBSAG Negative 2017    STREPBCULT No Group B Streptococcus isolated 2017       I have personallly reviewed all pertinent lab results from the last 24 hours.    Assessment/Plan:     26 y.o. female  at 39w6d for:     (normal spontaneous vaginal delivery)    Routine pp care  Pumping for baby  Desires mirena pp  P discharge home   Follow up 4 weeks        Clear vaginal discharge    A IUP at 39w6d  GBS negative  AROM clear  P monitor and assess  Begin pitocin augmentation            Taylor Tanner CNM  Obstetrics  [facility]

## 2017-09-06 ENCOUNTER — POSTPARTUM VISIT (OUTPATIENT)
Dept: OBSTETRICS AND GYNECOLOGY | Facility: CLINIC | Age: 27
End: 2017-09-06
Payer: MEDICAID

## 2017-09-06 VITALS
BODY MASS INDEX: 25.54 KG/M2 | WEIGHT: 178.38 LBS | DIASTOLIC BLOOD PRESSURE: 60 MMHG | HEIGHT: 70 IN | SYSTOLIC BLOOD PRESSURE: 108 MMHG

## 2017-09-06 PROBLEM — N89.8 CLEAR VAGINAL DISCHARGE: Status: RESOLVED | Noted: 2017-07-10 | Resolved: 2017-09-06

## 2017-09-06 PROBLEM — Z34.80 SUPERVISION OF OTHER NORMAL PREGNANCY: Status: RESOLVED | Noted: 2017-02-21 | Resolved: 2017-09-06

## 2017-09-06 PROCEDURE — 99212 OFFICE O/P EST SF 10 MIN: CPT | Mod: PBBFAC | Performed by: ADVANCED PRACTICE MIDWIFE

## 2017-09-06 PROCEDURE — 99999 PR PBB SHADOW E&M-EST. PATIENT-LVL II: CPT | Mod: PBBFAC,,, | Performed by: ADVANCED PRACTICE MIDWIFE

## 2017-09-06 NOTE — PROGRESS NOTES
26 y.o. female for postpartum visit.  Patient has no complaints.  Her delivery records were reviewed.  She is bottle feeding infant.    Exam  General - well appearing, no apparent distress  Abdomen - soft, non tender, non distended incision none.  Pelvic - normal external genitalia, any lacerations well healed               uterus non tender, appropriately sized  Extremeties - no edema    Assessment:  Encounter Diagnosis   Name Primary?    Routine postpartum follow-up Yes      Nexplanon for contraception. Consent form signed to order  F/u - return for annual exam when due

## 2017-09-08 ENCOUNTER — TELEPHONE (OUTPATIENT)
Dept: OBSTETRICS AND GYNECOLOGY | Facility: CLINIC | Age: 27
End: 2017-09-08

## 2017-10-18 ENCOUNTER — TELEPHONE (OUTPATIENT)
Dept: OBSTETRICS AND GYNECOLOGY | Facility: CLINIC | Age: 27
End: 2017-10-18

## 2017-10-18 NOTE — TELEPHONE ENCOUNTER
----- Message from Yandel Roldan sent at 10/18/2017 10:24 AM CDT -----  Contact: [phone]   Pt would like to check the status on birth control and to schedule a nurse visit appt.  Please call back at [phone].  Thx_AH

## 2017-10-18 NOTE — TELEPHONE ENCOUNTER
Nexplanon scheduled for delivery on Wednesday, October 25,2017.  Contacted patient and scheduled her appointment for insertion for November.

## 2017-10-26 ENCOUNTER — TELEPHONE (OUTPATIENT)
Dept: OBSTETRICS AND GYNECOLOGY | Facility: CLINIC | Age: 27
End: 2017-10-26

## 2017-10-26 NOTE — TELEPHONE ENCOUNTER
Nexplanon received at the [place] location.  Appointment made.  Handed to Abbey to place in the Med Room

## 2017-11-15 ENCOUNTER — TELEPHONE (OUTPATIENT)
Dept: OBSTETRICS AND GYNECOLOGY | Facility: CLINIC | Age: 27
End: 2017-11-15

## 2017-11-15 NOTE — TELEPHONE ENCOUNTER
----- Message from Nissa Acharya sent at 11/15/2017  7:33 AM CST -----  Contact: Pt  Pt request a call from the nurse to r/s her procedure on 11-21-17, please contact the pt at [phone]

## 2017-11-15 NOTE — TELEPHONE ENCOUNTER
Pt states she started her period today, has nexplanon placement scheduled for 11/21/17.  Per Gayathri, ok to keep appt on 11/21/17.  Pt informed and voiced understanding.  RAJANI PRAKASH

## 2017-11-15 NOTE — TELEPHONE ENCOUNTER
----- Message from Tiana Lindsay sent at 11/15/2017  2:06 PM CST -----  Contact: patient  States she is currently on her period and the procedure is set for 11/21. Please call patient @ [phone]. Thanks, cayla

## 2017-11-21 ENCOUNTER — TELEPHONE (OUTPATIENT)
Dept: OBSTETRICS AND GYNECOLOGY | Facility: CLINIC | Age: 27
End: 2017-11-21

## 2017-11-21 NOTE — TELEPHONE ENCOUNTER
----- Message from Cintia Meyers sent at 11/21/2017  9:19 AM CST -----  Pt at [phone]//states she had a procedure scheduled for this morning and is calling to reschedule//please call/thanks/ll

## 2017-11-22 ENCOUNTER — PROCEDURE VISIT (OUTPATIENT)
Dept: OBSTETRICS AND GYNECOLOGY | Facility: CLINIC | Age: 27
End: 2017-11-22
Payer: COMMERCIAL

## 2017-11-22 VITALS
BODY MASS INDEX: 27.9 KG/M2 | DIASTOLIC BLOOD PRESSURE: 64 MMHG | HEIGHT: 70 IN | SYSTOLIC BLOOD PRESSURE: 106 MMHG | WEIGHT: 194.88 LBS

## 2017-11-22 DIAGNOSIS — Z30.017 NEXPLANON INSERTION: Primary | ICD-10-CM

## 2017-11-22 PROCEDURE — 81025 URINE PREGNANCY TEST: CPT | Mod: PBBFAC | Performed by: ADVANCED PRACTICE MIDWIFE

## 2017-11-22 PROCEDURE — 11981 INSERTION DRUG DLVR IMPLANT: CPT | Mod: PBBFAC | Performed by: ADVANCED PRACTICE MIDWIFE

## 2017-11-22 PROCEDURE — 11981 INSERTION DRUG DLVR IMPLANT: CPT | Mod: S$GLB,,, | Performed by: ADVANCED PRACTICE MIDWIFE

## 2017-11-22 RX ADMIN — ETONOGESTREL 68 MG: 68 IMPLANT SUBCUTANEOUS at 02:11

## 2017-11-22 NOTE — PROCEDURES
Procedures   Nexplanon INSERTION:    PRE-Nexplanon INSERTION COUNSELING:  All contraceptive options were reviewed and the patient chooses Nexplanon.  Patients history was reviewed and there were no contraindications to Nexplanon.  The procedure and minimal risks of pain, bleeding, bruising and infection at the insertion site discussed. Possible irregular menstrual bleeding pattern versus amenorrhea was explained.  No protection against STDs discussed.  Written information provided; all questions answered and patient agrees to proceed.  Consent signed and scanned into computer.    EXAM:  With patient in supine position the nondominant left arm was flexed at the elbow and externally rotated.  The insertion site was identified 6-8 cm above the elbow crease at the inner side of the upper arm overlying the groove between biceps and triceps.    PROCEDURE:  The insertion site was prepped with antiseptic and injected with 3 cc of 1% Xylocaine without epinephrine subq along the planned insertion canal.    Using sterile technique the Nexplanon applicator was visually verified and removed from the blister pack.  The needle tip was inserted bevel side up at a 20 degree angle to penetrate the skin.  The applicator was lowered parallel to the arm and the skin was tented with the needle.  Once the needle was completely inserted, the Nexplanon was then deployed into the subcutaneous space.  The implant was palpable after insertion.  A steri strip was placed over the insertion site.  The patient tolerated the procedure well.    ASSESSMENT:  Nexplanon Insertion    POST Nexplanon INSERTION COUNSELING:  Manage post Implanon placement arm pain with NSAIDs  Keep arm elevated and apply intermittent ice packs to decrease pain and bruising for 24 Hours.  May remove bandage in 24 hours.  Nexplanon danger signs (worsening pain at insertion site, bleeding through bandage, redness and/or pus drainage at insertion site).  Removal in 3  years.

## 2018-09-09 NOTE — DISCHARGE SUMMARY
[facility] -   Obstetrics  Discharge Summary      Patient Name: Donna Kwong  MRN: [id number]  Admission Date: 2017  Hospital Length of Stay: 2 days  Discharge Date and Time:  2017 11:00 AM  Attending Physician: STEPHANIE Murray MD   Discharging Provider: Taylor Tanner CNM  Primary Care Provider: Marii Aguilar MD    HPI: PPD1 of vaginal delivery   Breastfeeding with pumping      * No surgery found *     Hospital Course:   PPD1 vaginal delivery of viable male infant   Breastfeeding and pumping  Ready for discharge        Final Active Diagnoses:    Diagnosis Date Noted POA    PRINCIPAL PROBLEM:   (normal spontaneous vaginal delivery) [O80] 2017 Not Applicable    Single live birth [Z37.0] 2017 Not Applicable    Clear vaginal discharge [N89.8] 07/10/2017 Yes    Supervision of other normal pregnancy [Z34.80] 2017 Not Applicable      Problems Resolved During this Admission:    Diagnosis Date Noted Date Resolved POA        Labs:   CBC   Recent Labs  Lab 17  1710   WBC 13.09*   HGB 12.0   HCT 33.8*   *       Feeding Method: breast    Immunizations     Date Immunization Status Dose Route/Site Given by    17 MMR Incomplete 0.5 mL Subcutaneous/Left deltoid     17 Tdap Incomplete 0.5 mL Intramuscular/Left deltoid           Delivery:    Episiotomy: None   Lacerations: None   Repair suture: None   Repair # of packets: 0   Blood loss (ml): 200     Birth information:  YOB: 2015   Time of birth: 9:42 PM   Sex: male   Delivery type: Vaginal, Spontaneous Delivery   Gestational Age: 39w6d    Delivery Clinician:      Other providers:       Additional  information:  Forceps:    Vacuum:    Breech:    Observed anomalies      Living?:           APGARS  One minute Five minutes Ten minutes   Skin color:         Heart rate:         Grimace:         Muscle tone:         Breathing:         Totals: 8  9        Placenta: Delivered:        appearance    Pending Diagnostic Studies:     None          Discharged Condition: good    Disposition: Home or Self Care    Follow Up:  Follow-up Information     Follow up In 4 weeks.               Patient Instructions:     Diet general     Activity as tolerated       Medications:  Current Discharge Medication List      CONTINUE these medications which have NOT CHANGED    Details   butalbital-acetaminophen-caffeine -40 mg (FIORICET, ESGIC) -40 mg per tablet Take 2 tablets by mouth every 6 (six) hours as needed for Pain.  Qty: 20 tablet, Refills: 0    Associated Diagnoses: Headache in pregnancy, antepartum, third trimester      PRENATAL VITS15/IRON/FOLIC/DSS (PRENATAL AD ORAL) Take 1 tablet by mouth once daily.             Taylor Tanner CNM  Obstetrics  [facility]   ADMIT

## 2019-01-03 ENCOUNTER — OFFICE VISIT (OUTPATIENT)
Dept: OBSTETRICS AND GYNECOLOGY | Facility: CLINIC | Age: 29
End: 2019-01-03
Payer: MEDICAID

## 2019-01-03 VITALS
BODY MASS INDEX: 26.2 KG/M2 | WEIGHT: 183 LBS | HEIGHT: 70 IN | SYSTOLIC BLOOD PRESSURE: 118 MMHG | DIASTOLIC BLOOD PRESSURE: 70 MMHG

## 2019-01-03 DIAGNOSIS — Z97.5 BREAKTHROUGH BLEEDING ON NEXPLANON: Primary | ICD-10-CM

## 2019-01-03 DIAGNOSIS — N92.1 BREAKTHROUGH BLEEDING ON NEXPLANON: Primary | ICD-10-CM

## 2019-01-03 PROCEDURE — 99999 PR PBB SHADOW E&M-EST. PATIENT-LVL III: CPT | Mod: PBBFAC,,, | Performed by: NURSE PRACTITIONER

## 2019-01-03 PROCEDURE — 99213 OFFICE O/P EST LOW 20 MIN: CPT | Mod: S$PBB,,, | Performed by: NURSE PRACTITIONER

## 2019-01-03 PROCEDURE — 99213 PR OFFICE/OUTPT VISIT, EST, LEVL III, 20-29 MIN: ICD-10-PCS | Mod: S$PBB,,, | Performed by: NURSE PRACTITIONER

## 2019-01-03 PROCEDURE — 99213 OFFICE O/P EST LOW 20 MIN: CPT | Mod: PBBFAC | Performed by: NURSE PRACTITIONER

## 2019-01-03 PROCEDURE — 99999 PR PBB SHADOW E&M-EST. PATIENT-LVL III: ICD-10-PCS | Mod: PBBFAC,,, | Performed by: NURSE PRACTITIONER

## 2019-01-03 RX ORDER — ESTRADIOL 1 MG/1
1 TABLET ORAL DAILY
Qty: 14 TABLET | Status: SHIPPED | OUTPATIENT
Start: 2019-01-03 | End: 2020-03-17

## 2019-01-03 NOTE — PROGRESS NOTES
"CC: Nexplanon bleeding    Donna Kwong is a 28 y.o. female  presents for c/o irregular bleeding with Nexplanon.  Patient denies pelvic pain. Has had Nexplanon since 2017.     Past Medical History:   Diagnosis Date    ADHD (attention deficit hyperactivity disorder)     Seizures     Tobacco use disorder      Past Surgical History:   Procedure Laterality Date    TONSILLECTOMY       Social History     Socioeconomic History    Marital status: Single     Spouse name: Not on file    Number of children: Not on file    Years of education: Not on file    Highest education level: Not on file   Social Needs    Financial resource strain: Not on file    Food insecurity - worry: Not on file    Food insecurity - inability: Not on file    Transportation needs - medical: Not on file    Transportation needs - non-medical: Not on file   Occupational History    Not on file   Tobacco Use    Smoking status: Current Every Day Smoker     Packs/day: 0.25     Years: 6.00     Pack years: 1.50     Types: Cigarettes    Smokeless tobacco: Current User     Last attempt to quit: 1/10/2017   Substance and Sexual Activity    Alcohol use: No     Alcohol/week: 0.0 oz     Frequency: Never    Drug use: No    Sexual activity: Yes     Partners: Male     Birth control/protection: None   Other Topics Concern    Not on file   Social History Narrative    Not on file     Family History   Problem Relation Age of Onset    Hypertension Father     Hepatitis Father         Hepatitis C    Hearing loss Other         no inner ear drum     Breast cancer Neg Hx     Colon cancer Neg Hx     Ovarian cancer Neg Hx      OB History      Para Term  AB Living    4 3 3 0 1 3    SAB TAB Ectopic Multiple Live Births    1 0 0 0 3          /70 (BP Location: Right arm, Patient Position: Sitting, BP Method: Medium (Manual))   Ht 5' 10" (1.778 m)   Wt 83 kg (182 lb 15.7 oz)   BMI 26.26 kg/m²       ROS:  GENERAL: Denies " weight gain or weight loss. Feeling well overall.   SKIN: Denies rash or lesions.   CARDIOVASCULAR: Denies palpitations or left sided chest pain.   ABDOMEN: No abdominal pain, constipation, diarrhea, nausea, vomiting or rectal bleeding.   URINARY: No frequency, dysuria, hematuria, or burning on urination.  REPRODUCTIVE: See HPI.       PHYSICAL EXAM:  APPEARANCE: Well nourished, well developed, in no acute distress.  AFFECT: WNL, alert and oriented x 3      1. Breakthrough bleeding on Nexplanon       PLAN:  Patient was educated that bleeding irregularly with Nexplanon is normal.  Estradiol for 14 days.   Patient to contact clinic if after treatment she wants to d/c Nexplanon

## 2019-05-20 ENCOUNTER — OFFICE VISIT (OUTPATIENT)
Dept: INTERNAL MEDICINE | Facility: CLINIC | Age: 29
End: 2019-05-20
Payer: MEDICAID

## 2019-05-20 VITALS — WEIGHT: 171.5 LBS | HEART RATE: 78 BPM | OXYGEN SATURATION: 97 % | BODY MASS INDEX: 24.61 KG/M2 | TEMPERATURE: 99 F

## 2019-05-20 DIAGNOSIS — R14.0 FLATULENCE/GAS PAIN/BELCHING: Primary | ICD-10-CM

## 2019-05-20 DIAGNOSIS — F90.9 ATTENTION DEFICIT HYPERACTIVITY DISORDER (ADHD), UNSPECIFIED ADHD TYPE: ICD-10-CM

## 2019-05-20 PROBLEM — F19.10 SUBSTANCE ABUSE: Status: ACTIVE | Noted: 2018-07-19

## 2019-05-20 PROCEDURE — 99213 OFFICE O/P EST LOW 20 MIN: CPT | Mod: PBBFAC | Performed by: FAMILY MEDICINE

## 2019-05-20 PROCEDURE — 99999 PR PBB SHADOW E&M-EST. PATIENT-LVL III: ICD-10-PCS | Mod: PBBFAC,,, | Performed by: FAMILY MEDICINE

## 2019-05-20 PROCEDURE — 99999 PR PBB SHADOW E&M-EST. PATIENT-LVL III: CPT | Mod: PBBFAC,,, | Performed by: FAMILY MEDICINE

## 2019-05-20 PROCEDURE — 99213 OFFICE O/P EST LOW 20 MIN: CPT | Mod: S$PBB,,, | Performed by: FAMILY MEDICINE

## 2019-05-20 PROCEDURE — 99213 PR OFFICE/OUTPT VISIT, EST, LEVL III, 20-29 MIN: ICD-10-PCS | Mod: S$PBB,,, | Performed by: FAMILY MEDICINE

## 2019-05-20 RX ORDER — POLYETHYLENE GLYCOL 3350 17 G/17G
17 POWDER, FOR SOLUTION ORAL DAILY PRN
Refills: 0 | COMMUNITY
Start: 2019-05-20 | End: 2020-03-17

## 2019-05-20 NOTE — PATIENT INSTRUCTIONS
Psychiatry Services  [place] [id number] [facility]  [phone]    [facility]  [phone]  [facility]  [phone]  [facility]  [phone]      Eating a High-Fiber Diet  Fiber is what gives strength and structure to plants. Most grains, beans, vegetables, and fruits contain fiber. Foods rich in fiber are often low in calories and fat, and they fill you up more. They may also reduce your risks for certain health problems. To find out the amount of fiber in canned, packaged, or frozen foods, read the Nutrition Facts label. It tells you how much fiber is in a serving.    Types of fiber and their benefits  There are two types of fiber: insoluble and soluble. They both aid digestion and help you maintain a healthy weight.  · Insoluble fiber. This is found in whole grains, cereals, certain fruits and vegetables such as apple skin, corn, and carrots. Insoluble fiber may prevent constipation and reduce the risk for certain types of cancer.  · Soluble fiber. This type of fiber is in oats, beans, and certain fruits and vegetables such as strawberries and peas. Soluble fiber can reduce cholesterol, which may help lower the risk for heart disease. It also helps control blood sugar levels.  Look for high-fiber foods  Try these foods to add fiber to your diet:  · Whole-grain breads and cereals. Try to eat 6 to 8 ounces a day. Include wheat and oat bran cereals, whole-wheat muffins or toast, and corn tortillas in your meals.  · Fruits. Try to eat 2 cups a day. Apples, oranges, strawberries, pears, and bananas are good sources. (Note: Fruit juice is low in fiber.)  · Vegetables. Try to eat at least 2.5 cups a day. Add asparagus, carrots, broccoli, peas, and corn to your meals.  · Beans. One cup of cooked lentils gives you over 15 grams of fiber. Try navy beans, lentils, and chickpeas.  · Seeds. A small handful of seeds gives you about 3 grams of fiber. Try sunflower seeds.  Keep track of your  fiber  Keep track of how much fiber you eat. Start by reading food labels. Then eat a variety of foods high in fiber. As you begin to eat more fiber, ask your healthcare provider how much water you should be drinking to keep your digestive system working smoothly.  You should aim for a certain amount of fiber in your diet each day. If you are a woman, that amount is between 25 and 28 grams per day. Men should aim for 30 to 33 grams per day. After age 50, your daily fiber needs drop to 22 grams for women and 28 grams for men.  Before you reach for the fiber supplements, think about this. Fiber is found naturally in healthy whole foods. It gives you that feeling of fullness after you eat. Taking fiber supplements or eating fiber-enriched foods will not give you this full feeling.  Your fiber intake is a good measure for the quality of your overall diet. If you are missing out on your daily amount of fiber, you may be lacking other important nutrients as well.  Date Last Reviewed: 5/11/2015 © 2000-2017 [employer]. [address] [place] [address]. All rights reserved. This information is not intended as a substitute for professional medical care. Always follow your healthcare professional's instructions.        Excess Gas  Certain foods produce gas when digested. In some people, these foods make an excessive amount of gas. This may cause bloating, burping, or increased gas passing through the rectum (flatulence).     Foods that cause gas  The following foods are more likely to cause this problem. Limit them, or remove them from your diet:  · Broccoli  · Cauliflower  · [place] sprouts  · Cabbage  · Cooked dried beans  · Fizzy (carbonated) drinks, such as sparkling water, soda, beer, and champagne  Other causes  Other causes of excess gas include:  · Eating too fast or talking while you chew. This may cause you to swallow air. This increases the amount of gas in your stomach. And it may make  your symptoms worse. Chew each mouthful completely before you swallow. Take your time.  · Chewing on gum or sucking on hard candy. These cause you to swallow more often. And some of what you are swallowing is air. This leads to more gas in your stomach. Avoid chewing gum and hard candy.  · Overeating. This may increase the feeling of being bloated and cause more gas. When you are full, stop eating.   · Being constipated. This can increase the amount of normal intestinal gas. Avoid constipation by getting more fiber in your diet. Good sources of fiber include whole-grain cereal, fresh vegetables (except those in the above list), and fresh fruits. High-fiber foods absorb water and carry it out of the body. When adding more fiber to your diet, you also need to drink more water. You should drink at least 8, 8-ounce glasses of water (2 quarts) per day.  Date Last Reviewed: 8/1/2016  © 9050-7460 [employer] [address] [place] [address], [place] [address]. All rights reserved. This information is not intended as a substitute for professional medical care. Always follow your healthcare professional's instructions.

## 2019-05-20 NOTE — ASSESSMENT & PLAN NOTE
Patient referred to psychiatry last visit with me in 2016; advised that due to interim history of substance abuse, I recommend she see psychiatry for management of ADHD as I am not comfortable with prescribing controlled substances in this circumstance; patient expressed understanding; given resources for psychiatry providers

## 2019-05-20 NOTE — PROGRESS NOTES
Subjective:       Patient ID: Donna Kwong is a 28 y.o. female.    Chief Complaint: Medication Refill (and itchy ears) and Abdominal Pain    Patient presents to clinic today to reestablish care.  She requests to resume ADHD medications.  Patient also reports trapped gas x 7-10 days. Normal bowel movement. No blood or black stools. No nausea or vomiting. Takes gas X with little relief. Denies acid reflux. Reports drinking through a straw most of the time. 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 Patient is otherwise without concerns today.      Review of Systems   Constitutional: Negative for chills, fatigue, fever and unexpected weight change.   Eyes: Negative for visual disturbance.   Respiratory: Negative for shortness of breath.    Cardiovascular: Negative for chest pain.   Gastrointestinal: Positive for abdominal pain. Negative for blood in stool, constipation, diarrhea, nausea and vomiting.   Musculoskeletal: Negative for myalgias.   Neurological: Negative for headaches.       Objective:      Physical Exam   Constitutional: She is oriented to person, place, and time. She appears well-developed and well-nourished. No distress.   HENT:   Head: Normocephalic and atraumatic.   Eyes: Pupils are equal, round, and reactive to light. Conjunctivae and EOM are normal. No scleral icterus.   Cardiovascular: Normal rate and regular rhythm. Exam reveals no gallop and no friction rub.   No murmur heard.  Pulmonary/Chest: Effort normal and breath sounds normal.   Abdominal: Soft. Bowel sounds are normal. She exhibits no distension and no mass. There is no hepatosplenomegaly. There is no tenderness.   Neurological: She is alert and oriented to person, place, and time. No cranial nerve deficit. Gait normal.   Psychiatric: She has a normal mood and affect.   Vitals reviewed.      Assessment:       1. Flatulence/gas pain/belching    2. Attention deficit  hyperactivity disorder (ADHD), unspecified ADHD type        Plan:     Problem List Items Addressed This Visit     ADHD (attention deficit hyperactivity disorder)    Current Assessment & Plan     Patient referred to psychiatry last visit with me in 2016; advised that due to interim history of substance abuse, I recommend she see psychiatry for management of ADHD as I am not comfortable with prescribing controlled substances in this circumstance; patient expressed understanding; given resources for psychiatry providers         Flatulence/gas pain/belching - Primary    Current Assessment & Plan     Advised to avoid drinking through a straw; advised to eat more whole foods and avoid processed foods; advised she can take miralax and/or fiber supplement as well; advised she may have some constipation despite having regular bowel movements; advised to follow up if worse/not improving for further evaluation. Patient expressed understanding.

## 2019-05-20 NOTE — ASSESSMENT & PLAN NOTE
Advised to avoid drinking through a straw; advised to eat more whole foods and avoid processed foods; advised she can take miralax and/or fiber supplement as well; advised she may have some constipation despite having regular bowel movements; advised to follow up if worse/not improving for further evaluation. Patient expressed understanding.

## 2019-05-22 ENCOUNTER — PATIENT MESSAGE (OUTPATIENT)
Dept: INTERNAL MEDICINE | Facility: CLINIC | Age: 29
End: 2019-05-22

## 2020-03-08 ENCOUNTER — HOSPITAL ENCOUNTER (EMERGENCY)
Facility: HOSPITAL | Age: 30
Discharge: HOME | End: 2020-03-09
Attending: FAMILY MEDICINE
Payer: MEDICAID

## 2020-03-08 DIAGNOSIS — F10.10 ALCOHOL ABUSE: ICD-10-CM

## 2020-03-08 DIAGNOSIS — R56.9 SEIZURE: Primary | ICD-10-CM

## 2020-03-08 LAB
BASOPHILS # BLD AUTO: 0.04 K/UL (ref 0–0.2)
BASOPHILS NFR BLD: 0.5 % (ref 0–1.9)
DIFFERENTIAL METHOD: ABNORMAL
EOSINOPHIL # BLD AUTO: 0.1 K/UL (ref 0–0.5)
EOSINOPHIL NFR BLD: 1.3 % (ref 0–8)
ERYTHROCYTE [DISTWIDTH] IN BLOOD BY AUTOMATED COUNT: 12 % (ref 11.5–14.5)
HCT VFR BLD AUTO: 40 % (ref 37–48.5)
HGB BLD-MCNC: 13.7 G/DL (ref 12–16)
IMM GRANULOCYTES # BLD AUTO: 0.02 K/UL (ref 0–0.04)
IMM GRANULOCYTES NFR BLD AUTO: 0.3 % (ref 0–0.5)
LYMPHOCYTES # BLD AUTO: 2.6 K/UL (ref 1–4.8)
LYMPHOCYTES NFR BLD: 33.7 % (ref 18–48)
MCH RBC QN AUTO: 32.4 PG (ref 27–31)
MCHC RBC AUTO-ENTMCNC: 34.3 G/DL (ref 32–36)
MCV RBC AUTO: 95 FL (ref 82–98)
MONOCYTES # BLD AUTO: 0.5 K/UL (ref 0.3–1)
MONOCYTES NFR BLD: 6.9 % (ref 4–15)
NEUTROPHILS # BLD AUTO: 4.4 K/UL (ref 1.8–7.7)
NEUTROPHILS NFR BLD: 57.3 % (ref 38–73)
NRBC BLD-RTO: 0 /100 WBC
PLATELET # BLD AUTO: 207 K/UL (ref 150–350)
PMV BLD AUTO: 11 FL (ref 9.2–12.9)
RBC # BLD AUTO: 4.23 M/UL (ref 4–5.4)
WBC # BLD AUTO: 7.71 K/UL (ref 3.9–12.7)

## 2020-03-08 PROCEDURE — 93005 ELECTROCARDIOGRAM TRACING: CPT

## 2020-03-08 PROCEDURE — 85730 THROMBOPLASTIN TIME PARTIAL: CPT

## 2020-03-08 PROCEDURE — 93010 EKG 12-LEAD: ICD-10-PCS | Mod: ,,, | Performed by: INTERNAL MEDICINE

## 2020-03-08 PROCEDURE — 36415 COLL VENOUS BLD VENIPUNCTURE: CPT

## 2020-03-08 PROCEDURE — 85025 COMPLETE CBC W/AUTO DIFF WBC: CPT

## 2020-03-08 PROCEDURE — 80320 DRUG SCREEN QUANTALCOHOLS: CPT

## 2020-03-08 PROCEDURE — 86703 HIV-1/HIV-2 1 RESULT ANTBDY: CPT

## 2020-03-08 PROCEDURE — 80307 DRUG TEST PRSMV CHEM ANLYZR: CPT

## 2020-03-08 PROCEDURE — 93010 ELECTROCARDIOGRAM REPORT: CPT | Mod: ,,, | Performed by: INTERNAL MEDICINE

## 2020-03-08 PROCEDURE — 85610 PROTHROMBIN TIME: CPT

## 2020-03-08 PROCEDURE — 80329 ANALGESICS NON-OPIOID 1 OR 2: CPT

## 2020-03-08 PROCEDURE — 99284 EMERGENCY DEPT VISIT MOD MDM: CPT | Mod: 25

## 2020-03-08 PROCEDURE — 96360 HYDRATION IV INFUSION INIT: CPT

## 2020-03-08 PROCEDURE — 84443 ASSAY THYROID STIM HORMONE: CPT

## 2020-03-08 PROCEDURE — 80053 COMPREHEN METABOLIC PANEL: CPT

## 2020-03-09 VITALS
HEIGHT: 70 IN | WEIGHT: 161.88 LBS | SYSTOLIC BLOOD PRESSURE: 111 MMHG | BODY MASS INDEX: 23.18 KG/M2 | RESPIRATION RATE: 18 BRPM | TEMPERATURE: 99 F | DIASTOLIC BLOOD PRESSURE: 59 MMHG | HEART RATE: 93 BPM | OXYGEN SATURATION: 100 %

## 2020-03-09 LAB
ALBUMIN SERPL BCP-MCNC: 4.1 G/DL (ref 3.5–5.2)
ALP SERPL-CCNC: 38 U/L (ref 55–135)
ALT SERPL W/O P-5'-P-CCNC: 16 U/L (ref 10–44)
AMPHET+METHAMPHET UR QL: NEGATIVE
ANION GAP SERPL CALC-SCNC: 13 MMOL/L (ref 8–16)
APAP SERPL-MCNC: <3 UG/ML (ref 10–20)
APTT BLDCRRT: 27.3 SEC (ref 21–32)
AST SERPL-CCNC: 20 U/L (ref 10–40)
B-HCG UR QL: NEGATIVE
BACTERIA #/AREA URNS HPF: ABNORMAL /HPF
BARBITURATES UR QL SCN>200 NG/ML: NEGATIVE
BENZODIAZ UR QL SCN>200 NG/ML: NEGATIVE
BILIRUB SERPL-MCNC: 0.3 MG/DL (ref 0.1–1)
BILIRUB UR QL STRIP: NEGATIVE
BUN SERPL-MCNC: 12 MG/DL (ref 6–20)
BZE UR QL SCN: NEGATIVE
CALCIUM SERPL-MCNC: 8.6 MG/DL (ref 8.7–10.5)
CANNABINOIDS UR QL SCN: NORMAL
CHLORIDE SERPL-SCNC: 106 MMOL/L (ref 95–110)
CLARITY UR: CLEAR
CO2 SERPL-SCNC: 21 MMOL/L (ref 23–29)
COLOR UR: YELLOW
CREAT SERPL-MCNC: 0.7 MG/DL (ref 0.5–1.4)
CREAT UR-MCNC: 150 MG/DL (ref 15–325)
EST. GFR  (AFRICAN AMERICAN): >60 ML/MIN/1.73 M^2
EST. GFR  (NON AFRICAN AMERICAN): >60 ML/MIN/1.73 M^2
ETHANOL SERPL-MCNC: 124 MG/DL
GLUCOSE SERPL-MCNC: 88 MG/DL (ref 70–110)
GLUCOSE UR QL STRIP: NEGATIVE
HGB UR QL STRIP: ABNORMAL
HIV 1+2 AB+HIV1 P24 AG SERPL QL IA: NEGATIVE
HYALINE CASTS #/AREA URNS LPF: 1 /LPF
INR PPP: 1 (ref 0.8–1.2)
KETONES UR QL STRIP: ABNORMAL
LEUKOCYTE ESTERASE UR QL STRIP: NEGATIVE
METHADONE UR QL SCN>300 NG/ML: NEGATIVE
MICROSCOPIC COMMENT: ABNORMAL
NITRITE UR QL STRIP: NEGATIVE
OPIATES UR QL SCN: NEGATIVE
PCP UR QL SCN>25 NG/ML: NEGATIVE
PH UR STRIP: 7 [PH] (ref 5–8)
POTASSIUM SERPL-SCNC: 3.7 MMOL/L (ref 3.5–5.1)
PROT SERPL-MCNC: 6.6 G/DL (ref 6–8.4)
PROT UR QL STRIP: ABNORMAL
PROTHROMBIN TIME: 10.2 SEC (ref 9–12.5)
RBC #/AREA URNS HPF: 20 /HPF (ref 0–4)
SALICYLATES SERPL-MCNC: <5 MG/DL (ref 15–30)
SODIUM SERPL-SCNC: 140 MMOL/L (ref 136–145)
SP GR UR STRIP: 1.02 (ref 1–1.03)
SQUAMOUS #/AREA URNS HPF: 15 /HPF
TOXICOLOGY INFORMATION: NORMAL
TSH SERPL DL<=0.005 MIU/L-ACNC: 3.5 UIU/ML (ref 0.4–4)
URN SPEC COLLECT METH UR: ABNORMAL
UROBILINOGEN UR STRIP-ACNC: 1 EU/DL
WBC #/AREA URNS HPF: 3 /HPF (ref 0–5)

## 2020-03-09 PROCEDURE — 80307 DRUG TEST PRSMV CHEM ANLYZR: CPT

## 2020-03-09 PROCEDURE — 63600175 PHARM REV CODE 636 W HCPCS: Performed by: FAMILY MEDICINE

## 2020-03-09 PROCEDURE — 81000 URINALYSIS NONAUTO W/SCOPE: CPT | Mod: 59

## 2020-03-09 PROCEDURE — 81025 URINE PREGNANCY TEST: CPT

## 2020-03-09 RX ADMIN — SODIUM CHLORIDE 1000 ML: 0.9 INJECTION, SOLUTION INTRAVENOUS at 12:03

## 2020-03-09 NOTE — ED PROVIDER NOTES
"SCRIBE #1 NOTE: I, Vinita Urbina, am scribing for, and in the presence of, Blanche Erazo MD. I have scribed the entire note.      History      Chief Complaint   Patient presents with    Seizures       Review of patient's allergies indicates:  No Known Allergies     HPI   HPI    3/8/2020, 11:24 PM   History obtained from the patient      History of Present Illness: Donna Kwong is a 29 y.o. female patient with a PMHx of Seizures who presents to the Emergency Department for evaluation following a seizure which occurred PTA. Patient had a witnessed seizure by a family member who states the patient's "whole body was shaking" that lasted approximately a minute. Patient admits to drinking vodka prior to having seizure. Patient denies being on any seizure medications at this time, stating that she self medicates with marijuana and hasn't taken seizure medication in over 10 years. Patient denies any N/V, headache, tongue biting, bladder/bowel incontinence, and all other sxs at this time. No further complaints or concerns at this time.       Arrival mode: Ambulance Service    PCP: Marii Aguilar MD       Past Medical History:  Past Medical History:   Diagnosis Date    ADHD (attention deficit hyperactivity disorder)     Seizures     Tobacco use disorder        Past Surgical History:  Past Surgical History:   Procedure Laterality Date    TONSILLECTOMY           Family History:  Family History   Problem Relation Age of Onset    Hypertension Father     Hepatitis Father         Hepatitis C    Hearing loss Other         no inner ear drum     Breast cancer Neg Hx     Colon cancer Neg Hx     Ovarian cancer Neg Hx        Social History:  Social History     Tobacco Use    Smoking status: Current Every Day Smoker     Packs/day: 0.25     Years: 6.00     Pack years: 1.50     Types: Cigarettes    Smokeless tobacco: Current User     Last attempt to quit: 1/10/2017   Substance and Sexual Activity    Alcohol use: Yes     " Alcohol/week: 0.0 standard drinks     Frequency: 4 or more times a week    Drug use: No    Sexual activity: Yes     Partners: Male     Birth control/protection: None       ROS   Review of Systems   Constitutional: Negative for fever.   HENT: Negative for sore throat.    Respiratory: Negative for shortness of breath.    Cardiovascular: Negative for chest pain.   Gastrointestinal: Negative for nausea and vomiting.   Genitourinary: Negative for dysuria.   Musculoskeletal: Negative for back pain.   Skin: Negative for rash.   Neurological: Positive for seizures. Negative for weakness and headaches.        (-) bladder/bowel incontinence   Hematological: Does not bruise/bleed easily.   All other systems reviewed and are negative.    Physical Exam      Initial Vitals [03/08/20 2301]   BP Pulse Resp Temp SpO2   114/76 80 16 98.6 °F (37 °C) 100 %      MAP       --          Physical Exam  Nursing Notes and Vital Signs Reviewed.  Constitutional: Patient is in no acute distress. Well-developed and well-nourished.  Head: Atraumatic. Normocephalic.  Eyes: PERRL. EOM intact. Conjunctivae are not pale. No scleral icterus.  ENT: Mucous membranes are moist.   Neck: Supple. Full ROM. No lymphadenopathy.  Cardiovascular: Regular rate. Regular rhythm. No murmurs, rubs, or gallops. Distal pulses are 2+ and symmetric.  Pulmonary/Chest: No respiratory distress. Clear to auscultation bilaterally. No wheezing or rales.  Abdominal: Soft and non-distended.  There is no tenderness.    Genitourinary: No CVA tenderness  Musculoskeletal: Moves all extremities. No obvious deformities. No edema.  Skin: Warm and dry.  Neurological:  Alert, awake, and appropriate.  Normal speech.  No acute focal neurological deficits are appreciated.  Psychiatric: Normal affect. Good eye contact. Appropriate in content.    ED Course    Procedures  ED Vital Signs:  Vitals:    03/08/20 2301 03/08/20 2316 03/08/20 2334 03/09/20 0132   BP: 114/76   (!) 111/59   Pulse: 80  " 73 93   Resp: 16   18   Temp: 98.6 °F (37 °C)   98.6 °F (37 °C)   TempSrc: Oral   Oral   SpO2: 100%   100%   Weight:  73.4 kg (161 lb 14.4 oz)     Height: 5' 10" (1.778 m)          Abnormal Lab Results:  Labs Reviewed   ACETAMINOPHEN LEVEL - Abnormal; Notable for the following components:       Result Value    Acetaminophen (Tylenol), Serum <3.0 (*)     All other components within normal limits   COMPREHENSIVE METABOLIC PANEL - Abnormal; Notable for the following components:    CO2 21 (*)     Calcium 8.6 (*)     Alkaline Phosphatase 38 (*)     All other components within normal limits   CBC W/ AUTO DIFFERENTIAL - Abnormal; Notable for the following components:    Mean Corpuscular Hemoglobin 32.4 (*)     All other components within normal limits   SALICYLATE LEVEL - Abnormal; Notable for the following components:    Salicylate Lvl <5.0 (*)     All other components within normal limits   URINALYSIS, REFLEX TO URINE CULTURE - Abnormal; Notable for the following components:    Protein, UA 2+ (*)     Ketones, UA Trace (*)     Occult Blood UA 3+ (*)     All other components within normal limits    Narrative:     Preferred Collection Type->Urine, Clean Catch   ALCOHOL,MEDICAL (ETHANOL) - Abnormal; Notable for the following components:    Alcohol, Medical, Serum 124 (*)     All other components within normal limits   URINALYSIS MICROSCOPIC - Abnormal; Notable for the following components:    RBC, UA 20 (*)     All other components within normal limits    Narrative:     Preferred Collection Type->Urine, Clean Catch   HIV 1 / 2 ANTIBODY   APTT   TSH   PROTIME-INR   PREGNANCY TEST, URINE RAPID   DRUG SCREEN PANEL, URINE EMERGENCY        All Lab Results:  Results for orders placed or performed during the hospital encounter of 03/08/20   HIV 1/2 Ag/Ab (4th Gen)   Result Value Ref Range    HIV 1/2 Ag/Ab Negative Negative   Acetaminophen level   Result Value Ref Range    Acetaminophen (Tylenol), Serum <3.0 (L) 10.0 - 20.0 ug/mL "   APTT   Result Value Ref Range    aPTT 27.3 21.0 - 32.0 sec   Comprehensive metabolic panel   Result Value Ref Range    Sodium 140 136 - 145 mmol/L    Potassium 3.7 3.5 - 5.1 mmol/L    Chloride 106 95 - 110 mmol/L    CO2 21 (L) 23 - 29 mmol/L    Glucose 88 70 - 110 mg/dL    BUN, Bld 12 6 - 20 mg/dL    Creatinine 0.7 0.5 - 1.4 mg/dL    Calcium 8.6 (L) 8.7 - 10.5 mg/dL    Total Protein 6.6 6.0 - 8.4 g/dL    Albumin 4.1 3.5 - 5.2 g/dL    Total Bilirubin 0.3 0.1 - 1.0 mg/dL    Alkaline Phosphatase 38 (L) 55 - 135 U/L    AST 20 10 - 40 U/L    ALT 16 10 - 44 U/L    Anion Gap 13 8 - 16 mmol/L    eGFR if African American >60 >60 mL/min/1.73 m^2    eGFR if non African American >60 >60 mL/min/1.73 m^2   CBC auto differential   Result Value Ref Range    WBC 7.71 3.90 - 12.70 K/uL    RBC 4.23 4.00 - 5.40 M/uL    Hemoglobin 13.7 12.0 - 16.0 g/dL    Hematocrit 40.0 37.0 - 48.5 %    Mean Corpuscular Volume 95 82 - 98 fL    Mean Corpuscular Hemoglobin 32.4 (H) 27.0 - 31.0 pg    Mean Corpuscular Hemoglobin Conc 34.3 32.0 - 36.0 g/dL    RDW 12.0 11.5 - 14.5 %    Platelets 207 150 - 350 K/uL    MPV 11.0 9.2 - 12.9 fL    Immature Granulocytes 0.3 0.0 - 0.5 %    Gran # (ANC) 4.4 1.8 - 7.7 K/uL    Immature Grans (Abs) 0.02 0.00 - 0.04 K/uL    Lymph # 2.6 1.0 - 4.8 K/uL    Mono # 0.5 0.3 - 1.0 K/uL    Eos # 0.1 0.0 - 0.5 K/uL    Baso # 0.04 0.00 - 0.20 K/uL    nRBC 0 0 /100 WBC    Gran% 57.3 38.0 - 73.0 %    Lymph% 33.7 18.0 - 48.0 %    Mono% 6.9 4.0 - 15.0 %    Eosinophil% 1.3 0.0 - 8.0 %    Basophil% 0.5 0.0 - 1.9 %    Differential Method Automated    TSH   Result Value Ref Range    TSH 3.503 0.400 - 4.000 uIU/mL   Salicylate level   Result Value Ref Range    Salicylate Lvl <5.0 (L) 15.0 - 30.0 mg/dL   Protime-INR   Result Value Ref Range    Prothrombin Time 10.2 9.0 - 12.5 sec    INR 1.0 0.8 - 1.2   Pregnancy, urine rapid   Result Value Ref Range    Preg Test, Ur Negative    Urinalysis, Reflex to Urine Culture Urine, Clean Catch    Result Value Ref Range    Specimen UA Urine, Clean Catch     Color, UA Yellow Yellow, Straw, Portia    Appearance, UA Clear Clear    pH, UA 7.0 5.0 - 8.0    Specific Gravity, UA 1.025 1.005 - 1.030    Protein, UA 2+ (A) Negative    Glucose, UA Negative Negative    Ketones, UA Trace (A) Negative    Bilirubin (UA) Negative Negative    Occult Blood UA 3+ (A) Negative    Nitrite, UA Negative Negative    Urobilinogen, UA 1.0 <2.0 EU/dL    Leukocytes, UA Negative Negative   Drug screen panel, emergency   Result Value Ref Range    Benzodiazepines Negative     Methadone metabolites Negative     Cocaine (Metab.) Negative     Opiate Scrn, Ur Negative     Barbiturate Screen, Ur Negative     Amphetamine Screen, Ur Negative     THC Presumptive Positive     Phencyclidine Negative     Creatinine, Random Ur 150.0 15.0 - 325.0 mg/dL    Toxicology Information SEE COMMENT    Ethanol   Result Value Ref Range    Alcohol, Medical, Serum 124 (H) <10 mg/dL   Urinalysis Microscopic   Result Value Ref Range    RBC, UA 20 (H) 0 - 4 /hpf    WBC, UA 3 0 - 5 /hpf    Bacteria Rare None-Occ /hpf    Squam Epithel, UA 15 /hpf    Hyaline Casts, UA 1 0-1/lpf /lpf    Microscopic Comment SEE COMMENT          Imaging Results:  Imaging Results    None             The EKG was ordered, reviewed, and independently interpreted by the ED provider.  Interpretation time: 23:19  Rate: 75 BPM  Rhythm: Normal sinus rhythm with sinus arrhythmia  Interpretation: No acute ST changes. No STEMI.       The Emergency Provider reviewed the vital signs and test results, which are outlined above.    ED Discussion     1:11 AM: Reassessed pt at this time.  Pt states her condition has improved at this time. Discussed with pt all pertinent ED information and results. Discussed pt dx and plan of tx. Gave pt all f/u and return to the ED instructions. All questions and concerns were addressed at this time. Pt expresses understanding of information and instructions, and is  comfortable with plan to discharge. Pt is stable for discharge.    Patient presents with seizure or seizure-like symptoms. I have no suspicion of an intracranial, traumatic, infectious, metabolic, toxic, cardiovascular (specifically arrhythmia), or other emergent medical condition requiring further intervention. Seizure precautions were discussed with the patient and/or caretaker, specifically not to swim unattended, not to operate motor vehicles or other machinery, and to avoid heights or other areas where falls may occur until cleared by primary care physician. Patient is safe for discharge.        ED Course as of Mar 11 2133   Mon Mar 09, 2020   0037 Re-evaluation:  Patient is resting comfortably in bed.  No seizure activity at this time.  Vitals are stable.    [CARMEN]   0056 Patient presents with seizure or seizure-like symptoms. I have no suspicion of an intracranial, traumatic, infectious, metabolic, toxic, cardiovascular (specifically arrhythmia), or other emergent medical condition requiring further intervention. Seizure precautions were discussed with the patient and/or caretaker, specifically not to swim unattended, not to operate motor vehicles or other machinery, and to avoid heights or other areas where falls may occur until cleared by primary care physician. Patient is safe for discharge.        [CARMEN]   0113 Patient is stable for discharge.  She has a friend/family member who will drive her.    [CARMEN]      ED Course User Index  [CARMEN] Blanche Erazo MD       ED Medication(s):  Medications   sodium chloride 0.9% bolus 1,000 mL (0 mLs Intravenous Stopped 3/9/20 0133)     Current Discharge Medication List          Follow-up Information     Marii Aguilar MD. Schedule an appointment as soon as possible for a visit in 3 days.    Specialty:  Family Medicine  Contact information:  [address] [place] [address] [address] Rolanda [place]ON [id number] [phone]                     Medical Decision Making    Medical Decision Making:  "  Clinical Tests:   Lab Tests: Ordered and Reviewed           Scribe Attestation:   Scribe #1: I performed the above scribed service and the documentation accurately describes the services I performed. I attest to the accuracy of the note.    Attending:   Physician Attestation Statement for Scribe #1: I, Blanche Erazo MD, personally performed the services described in this documentation, as scribed by Vinita Urbina, in my presence, and it is both accurate and complete.          Clinical Impression       ICD-10-CM ICD-9-CM   1. Seizure R56.9 780.39   2. Alcohol abuse F10.10 305.00       Disposition:   Disposition: Discharged  Condition: Stable    Portions of this note may have been created with voice recognition software. Occasional "wrong-word" or "sound-a-like" substitutions may have occurred due to the inherent limitations of voice recognition software. Please, read the note carefully and recognize, using context, where substitutions have occurred.          Blanche Erazo MD  03/11/20 8098    "

## 2020-03-09 NOTE — DISCHARGE INSTRUCTIONS
Please follow-up with primary care physician.  I highly recommend that you also follow-up with neurology as you state you have history of seizures prior and your on medications.  If symptoms recur, you have weakness or numbness to 1 side, are altered please not hesitate to come back to emergency department.

## 2020-03-13 ENCOUNTER — TELEPHONE (OUTPATIENT)
Dept: INTERNAL MEDICINE | Facility: CLINIC | Age: 30
End: 2020-03-13

## 2020-03-13 NOTE — TELEPHONE ENCOUNTER
----- Message from Ginger Uribe sent at 3/13/2020 10:19 AM CDT -----  Contact: pt  Type:  Same Day Appointment Request    Caller is requesting a same day appointment.  Caller declined first available appointment listed below.    Name of Caller: the pt   When is the first available appointment? 04/02/2020  Symptoms: work release form  Best Call Back Number: [phone]  Additional Information: n/a

## 2020-03-17 ENCOUNTER — PATIENT MESSAGE (OUTPATIENT)
Dept: INTERNAL MEDICINE | Facility: CLINIC | Age: 30
End: 2020-03-17

## 2020-03-17 ENCOUNTER — OFFICE VISIT (OUTPATIENT)
Dept: INTERNAL MEDICINE | Facility: CLINIC | Age: 30
End: 2020-03-17
Payer: MEDICAID

## 2020-03-17 ENCOUNTER — TELEPHONE (OUTPATIENT)
Dept: INTERNAL MEDICINE | Facility: CLINIC | Age: 30
End: 2020-03-17

## 2020-03-17 DIAGNOSIS — F19.10 SUBSTANCE ABUSE: ICD-10-CM

## 2020-03-17 DIAGNOSIS — G40.909 SEIZURE DISORDER: Primary | ICD-10-CM

## 2020-03-17 PROBLEM — R14.0 FLATULENCE/GAS PAIN/BELCHING: Status: RESOLVED | Noted: 2019-05-20 | Resolved: 2020-03-17

## 2020-03-17 PROCEDURE — 99213 OFFICE O/P EST LOW 20 MIN: CPT | Mod: 95,,, | Performed by: NURSE PRACTITIONER

## 2020-03-17 PROCEDURE — 99213 PR OFFICE/OUTPT VISIT, EST, LEVL III, 20-29 MIN: ICD-10-PCS | Mod: 95,,, | Performed by: NURSE PRACTITIONER

## 2020-03-17 NOTE — TELEPHONE ENCOUNTER
Spoke to patient and her phone is not a supported system. She will try to do this with a [employer] system once her boyfriend comes home. She will call back to set this up.

## 2020-03-17 NOTE — PROGRESS NOTES
Donna Kwong 29 y.o. female     Chief Complaint:  Return to work release     History of Present Illness:  (Telemed visit)   The patient location is: home   The chief complaint leading to consultation is: work release   Visit type: Virtual visit with synchronous audio and video  Total time spent with patient: 10 minutes   Each patient to whom he or she provides medical services by telemedicine is:  (1) informed of the relationship between the physician and patient and the respective role of any other health care provider with respect to management of the patient; and (2) notified that he or she may decline to receive medical services by telemedicine and may withdraw from such care at any time.    Today she is requesting a statement of clearance to return to work following a seizure she had on 3/8 and was seen in ED for.     Hx childhood seizures    Reports having them 1-2x/year, stress related   Does not have a neurologist currently   Has not been on seizure meds regularly in 10 years   States most give her SE or make seizures worse     Works as overnight manager for [employer]   Currently drives  Smokes marijuana daily to self medicate   More frequent seizures when out of weed     Exam:  Review of Systems   Constitutional: Negative for activity change and unexpected weight change.   HENT: Negative for hearing loss, rhinorrhea and trouble swallowing.    Eyes: Negative for discharge and visual disturbance.   Respiratory: Negative for chest tightness and wheezing.    Cardiovascular: Negative for chest pain and palpitations.   Gastrointestinal: Negative for blood in stool, constipation, diarrhea and vomiting.   Endocrine: Negative for polydipsia and polyuria.   Genitourinary: Negative for difficulty urinating, dysuria, hematuria and menstrual problem.   Musculoskeletal: Negative for arthralgias, joint swelling and neck pain.   Neurological: Positive for seizures (last on 3/8 after episode of alcohol intoxication ).  Negative for weakness and headaches.   Psychiatric/Behavioral: Negative for confusion and dysphoric mood.     Physical Exam   Constitutional: She is oriented to person, place, and time. She appears well-developed and well-nourished.  Non-toxic appearance. She does not have a sickly appearance. She does not appear ill. No distress.   HENT:   Head: Normocephalic and atraumatic.   Right Ear: External ear normal.   Left Ear: External ear normal.   Eyes: Pupils are equal, round, and reactive to light. Conjunctivae, EOM and lids are normal. Right eye exhibits no discharge. Left eye exhibits no discharge. No scleral icterus.   Pulmonary/Chest: Effort normal.   Musculoskeletal: Normal range of motion.   Neurological: She is alert and oriented to person, place, and time.   Skin: Skin is warm and dry. She is not diaphoretic. No erythema.   Psychiatric: She has a normal mood and affect. Her speech is normal and behavior is normal. Judgment and thought content normal. Cognition and memory are normal.       Most Recent Laboratory Results Reviewed ({Yes)  Lab Results   Component Value Date    WBC 7.71 03/08/2020    HGB 13.7 03/08/2020    HCT 40.0 03/08/2020     03/08/2020    ALT 16 03/08/2020    AST 20 03/08/2020     03/08/2020    K 3.7 03/08/2020     03/08/2020    CREATININE 0.7 03/08/2020    BUN 12 03/08/2020    CO2 21 (L) 03/08/2020    TSH 3.503 03/08/2020    INR 1.0 03/08/2020       Assessment     ICD-10-CM ICD-9-CM   1. Seizure disorder G40.909 345.90   2. Substance abuse F19.10 305.90        Plan   Seizure disorder  - unmedicated  - sent pt message via [employer] that she is not clear to drive and needs neuro work up, offered internal or external referral   - she should not work unattended or with sharp objects     Substance abuse  - likely induced the above     * Consulted with PCP on POC     Follow up if symptoms worsen or fail to improve.

## 2020-03-17 NOTE — TELEPHONE ENCOUNTER
----- Message from Tamara Dangelo sent at 3/17/2020  9:13 AM CDT -----  Contact: self  Patient requesting a call back regarding her video visit. She was unable to sign in and would like to know if she can still get her work release. Please call pt back at [phone]

## 2020-03-20 ENCOUNTER — PATIENT MESSAGE (OUTPATIENT)
Dept: INTERNAL MEDICINE | Facility: CLINIC | Age: 30
End: 2020-03-20

## 2020-06-09 ENCOUNTER — TELEPHONE (OUTPATIENT)
Dept: INTERNAL MEDICINE | Facility: CLINIC | Age: 30
End: 2020-06-09

## 2020-06-09 DIAGNOSIS — G40.909 SEIZURE DISORDER: Primary | ICD-10-CM

## 2020-06-09 NOTE — TELEPHONE ENCOUNTER
Spoke with pt states she needs referral to Neurologist for seizures off the office note from 3/17/20

## 2020-06-09 NOTE — TELEPHONE ENCOUNTER
----- Message from Rosemarie Telles sent at 6/9/2020 11:07 AM CDT -----  Contact: pt  States she needs a referral faxed to a Neurologist at [facility] in [place] on [address] Fax# [phone]. States they told her to get it sent in as urgency for the need of a appointment. Please call pt [phone]. Thank you

## 2020-06-18 ENCOUNTER — PATIENT OUTREACH (OUTPATIENT)
Dept: ADMINISTRATIVE | Facility: HOSPITAL | Age: 30
End: 2020-06-18

## 2020-06-18 NOTE — PROGRESS NOTES
PreVisit Chart Audit Performed   HM updated with recent information       Message sent to OBGYN to schedulers for appt    Rosalva ELIAS LPN Care Coordinator  Care Coordination Department  [facility]  [phone]

## 2020-07-07 ENCOUNTER — PATIENT OUTREACH (OUTPATIENT)
Dept: ADMINISTRATIVE | Facility: OTHER | Age: 30
End: 2020-07-07

## 2020-07-09 ENCOUNTER — OFFICE VISIT (OUTPATIENT)
Dept: OBSTETRICS AND GYNECOLOGY | Facility: CLINIC | Age: 30
End: 2020-07-09
Payer: MEDICAID

## 2020-07-09 VITALS — BODY MASS INDEX: 23.23 KG/M2 | HEIGHT: 70 IN

## 2020-07-09 DIAGNOSIS — Z01.419 ENCOUNTER FOR GYNECOLOGICAL EXAMINATION WITHOUT ABNORMAL FINDING: Primary | ICD-10-CM

## 2020-07-09 PROCEDURE — 99499 UNLISTED E&M SERVICE: CPT | Mod: S$PBB,,, | Performed by: NURSE PRACTITIONER

## 2020-07-09 PROCEDURE — 99499 NO LOS: ICD-10-PCS | Mod: S$PBB,,, | Performed by: NURSE PRACTITIONER

## 2020-07-09 PROCEDURE — 99211 OFF/OP EST MAY X REQ PHY/QHP: CPT | Mod: PBBFAC | Performed by: NURSE PRACTITIONER

## 2020-07-13 ENCOUNTER — OFFICE VISIT (OUTPATIENT)
Dept: OBSTETRICS AND GYNECOLOGY | Facility: CLINIC | Age: 30
End: 2020-07-13
Payer: MEDICAID

## 2020-07-13 VITALS
WEIGHT: 191.81 LBS | BODY MASS INDEX: 27.52 KG/M2 | DIASTOLIC BLOOD PRESSURE: 70 MMHG | SYSTOLIC BLOOD PRESSURE: 122 MMHG

## 2020-07-13 DIAGNOSIS — Z01.419 ROUTINE GYNECOLOGICAL EXAMINATION: Primary | ICD-10-CM

## 2020-07-13 DIAGNOSIS — Z30.46 ENCOUNTER FOR SURVEILLANCE OF IMPLANTABLE SUBDERMAL CONTRACEPTIVE: ICD-10-CM

## 2020-07-13 DIAGNOSIS — Z12.4 PAPANICOLAOU SMEAR FOR CERVICAL CANCER SCREENING: ICD-10-CM

## 2020-07-13 PROCEDURE — 99999 PR PBB SHADOW E&M-EST. PATIENT-LVL II: ICD-10-PCS | Mod: PBBFAC,,, | Performed by: NURSE PRACTITIONER

## 2020-07-13 PROCEDURE — 99395 PR PREVENTIVE VISIT,EST,18-39: ICD-10-PCS | Mod: S$PBB,,, | Performed by: NURSE PRACTITIONER

## 2020-07-13 PROCEDURE — 99999 PR PBB SHADOW E&M-EST. PATIENT-LVL II: CPT | Mod: PBBFAC,,, | Performed by: NURSE PRACTITIONER

## 2020-07-13 PROCEDURE — 99212 OFFICE O/P EST SF 10 MIN: CPT | Mod: PBBFAC | Performed by: NURSE PRACTITIONER

## 2020-07-13 PROCEDURE — 99395 PREV VISIT EST AGE 18-39: CPT | Mod: S$PBB,,, | Performed by: NURSE PRACTITIONER

## 2020-07-13 PROCEDURE — 88175 CYTOPATH C/V AUTO FLUID REDO: CPT

## 2020-07-13 NOTE — PROGRESS NOTES
CC: Well woman exam    HPI  Donna Kwong is a 29 y.o. female  presents for a well woman exam.  LMP: Patient's last menstrual period was 2020 (approximate)..  No issues, problems, or complaints.    Past Medical History:   Diagnosis Date    ADHD (attention deficit hyperactivity disorder)     Seizures     Tobacco use disorder      Past Surgical History:   Procedure Laterality Date    TONSILLECTOMY       Social History     Socioeconomic History    Marital status: Single     Spouse name: Not on file    Number of children: Not on file    Years of education: Not on file    Highest education level: Not on file   Occupational History    Not on file   Social Needs    Financial resource strain: Not very hard    Food insecurity     Worry: Not on file     Inability: Not on file    Transportation needs     Medical: No     Non-medical: No   Tobacco Use    Smoking status: Current Every Day Smoker     Packs/day: 0.25     Years: 6.00     Pack years: 1.50     Types: Cigarettes    Smokeless tobacco: Current User     Last attempt to quit: 1/10/2017   Substance and Sexual Activity    Alcohol use: Yes     Alcohol/week: 0.0 standard drinks     Frequency: Monthly or less     Drinks per session: 1 or 2     Binge frequency: Monthly    Drug use: No    Sexual activity: Yes     Partners: Male     Birth control/protection: None   Lifestyle    Physical activity     Days per week: 3 days     Minutes per session: 30 min    Stress: Only a little   Relationships    Social connections     Talks on phone: Not on file     Gets together: Not on file     Attends Anabaptist service: Not on file     Active member of club or organization: Not on file     Attends meetings of clubs or organizations: Not on file     Relationship status: Not on file   Other Topics Concern    Not on file   Social History Narrative    Not on file     Family History   Problem Relation Age of Onset    Hypertension Father     Hepatitis Father          Hepatitis C    Hearing loss Other         no inner ear drum     Breast cancer Neg Hx     Colon cancer Neg Hx     Ovarian cancer Neg Hx      OB History        4    Para   3    Term   3       0    AB   1    Living   3       SAB   1    TAB   0    Ectopic   0    Multiple   0    Live Births   3                 Current Outpatient Medications:     etonogestrel (NEXPLANON) 68 mg Impl, by Subdermal route., Disp: , Rfl:     GYNECOLOGY HISTORY:  Happy with Nexplanon as method of birth control, Desires to order replacement today   Declines STD screening       DATA REVIEWED:  Last pap: 2017 pap with normal results, will collect pap today     /70   Wt 87 kg (191 lb 12.8 oz)   LMP 2020 (Approximate)   BMI 27.52 kg/m²     Review of Systems   Constitutional: Negative.    HENT: Negative.    Eyes: Negative.    Respiratory: Negative.    Cardiovascular: Negative.    Gastrointestinal: Negative.    Endocrine: Negative.    Genitourinary: Negative.    Musculoskeletal: Negative.    Allergic/Immunologic: Negative.    Neurological: Negative.    Hematological: Negative.    Psychiatric/Behavioral: Negative.        Physical Exam  Constitutional:       Appearance: Normal appearance.   HENT:      Head: Normocephalic.      Nose: Nose normal.      Mouth/Throat:      Mouth: Mucous membranes are moist.   Eyes:      Extraocular Movements: Extraocular movements intact.   Neck:      Musculoskeletal: Normal range of motion.   Pulmonary:      Effort: Pulmonary effort is normal.   Chest:      Breasts:         Right: Normal.         Left: Normal.   Abdominal:      General: Abdomen is flat.      Palpations: Abdomen is soft.   Genitourinary:     General: Normal vulva.      Exam position: Supine.      Rectum: Normal.   Musculoskeletal: Normal range of motion.   Skin:     General: Skin is warm and dry.   Neurological:      Mental Status: She is alert and oriented to person, place, and time.   Psychiatric:         Mood  and Affect: Mood normal.         Behavior: Behavior normal.         Thought Content: Thought content normal.         Judgment: Judgment normal.         Routine gynecological examination    Papanicolaou smear for cervical cancer screening  -     Liquid-Based Pap Smear, Screening    Encounter for surveillance of implantable subdermal contraceptive    left arm implant palpated and visualized. Will order Neplanon replacement prior to client leaving clinic today      Patient was counseled today on A.C.S. Pap guidelines (Q3) and recommendations for yearly pelvic exams, yearly mammograms starting age 40, and clinical breast exams; to see her PCP for other health maintenance.

## 2020-07-22 LAB
FINAL PATHOLOGIC DIAGNOSIS: NORMAL
Lab: NORMAL

## 2020-08-06 ENCOUNTER — PROCEDURE VISIT (OUTPATIENT)
Dept: OBSTETRICS AND GYNECOLOGY | Facility: CLINIC | Age: 30
End: 2020-08-06
Payer: MEDICAID

## 2020-08-06 VITALS
WEIGHT: 189.63 LBS | HEIGHT: 70 IN | DIASTOLIC BLOOD PRESSURE: 84 MMHG | SYSTOLIC BLOOD PRESSURE: 120 MMHG | BODY MASS INDEX: 27.15 KG/M2

## 2020-08-06 DIAGNOSIS — Z30.46 NEXPLANON REMOVAL: ICD-10-CM

## 2020-08-06 DIAGNOSIS — Z30.017 NEXPLANON INSERTION: Primary | ICD-10-CM

## 2020-08-06 PROCEDURE — 11983 PR REMOVAL W/ REINSERT DRUG IMPLANT DEVICE: ICD-10-PCS | Mod: S$PBB,,, | Performed by: NURSE PRACTITIONER

## 2020-08-06 PROCEDURE — 11983 REMOVE/INSERT DRUG IMPLANT: CPT | Mod: S$PBB,,, | Performed by: NURSE PRACTITIONER

## 2020-08-06 PROCEDURE — 11981 INSERTION DRUG DLVR IMPLANT: CPT | Mod: PBBFAC | Performed by: NURSE PRACTITIONER

## 2020-08-06 PROCEDURE — 11983 REMOVE/INSERT DRUG IMPLANT: CPT | Mod: PBBFAC | Performed by: NURSE PRACTITIONER

## 2020-08-06 NOTE — PROCEDURES
Insertion of Nexplanon    Date/Time: 8/6/2020 10:45 AM  Performed by: Kathrin Waldron NP  Authorized by: Kathrin Waldron NP     Consent obtained:  Verbal and written  Consent given by:  Patient  Patient questions answered: yes    Patient agrees, verbalizes understanding, and wants to proceed: yes    Educational handouts given: yes    Instructions and paperwork completed: yes    Pre-procedure timeout performed: yes    Local anesthetic:  Lidocaine without epinephrine  The site was cleaned and prepped in a sterile fashion: yes    Small stab incision was made in arm: yes    Left/right:  Left  Preloaded Implanon trocar was placed subdermally: yes    Visualization of implant was obtained: yes    Nexplanon was inserted and trocar removed: yes    Visualization of notch in stilette and palpitation of device: yes    Palpitation confirms placement by provider and patient: yes    Site was closed with steri-strips and pressure bandage applied: yes     Lot number [id number]  Exp [id number]

## 2020-08-06 NOTE — PROCEDURES
NEXPLANON REMOVAL    Provider GLENNY ShaikhAdonisBC  Date of procedure: 8/6/2020  Exam: Nexplanon easily palpated in left upper extremity    Procedure: Skin overlying the device was prepped with alcohol.  2 cc 1% lidocaine without epinephrine was injected subcutaneously.  The skin was then prepped with betadine.  A 3mm incision was made  at the tip of the implant using a #11 scalpel.  The device was pushed toward the incision, grasped with hemostats, and was removed intact.  Hemostasis was achieved with gentle pressure.  The wound was dressed with a band-aid.  The patient tolerated the procedure well.    Post-procedure counseling: The patient was given pain, fever, and bleeding precautions.  Advised to use tylenol and ibuprofen prn pain.    RTC: prn

## 2021-01-26 ENCOUNTER — TELEPHONE (OUTPATIENT)
Dept: OBSTETRICS AND GYNECOLOGY | Facility: CLINIC | Age: 31
End: 2021-01-26

## 2021-01-27 ENCOUNTER — TELEPHONE (OUTPATIENT)
Dept: RADIOLOGY | Facility: HOSPITAL | Age: 31
End: 2021-01-27

## 2021-01-27 ENCOUNTER — OFFICE VISIT (OUTPATIENT)
Dept: OBSTETRICS AND GYNECOLOGY | Facility: CLINIC | Age: 31
End: 2021-01-27
Payer: MEDICAID

## 2021-01-27 VITALS
DIASTOLIC BLOOD PRESSURE: 70 MMHG | WEIGHT: 197.56 LBS | HEIGHT: 70 IN | BODY MASS INDEX: 28.28 KG/M2 | SYSTOLIC BLOOD PRESSURE: 130 MMHG

## 2021-01-27 DIAGNOSIS — Z97.5 NEXPLANON IN PLACE: ICD-10-CM

## 2021-01-27 DIAGNOSIS — R10.2 PELVIC PAIN IN FEMALE: Primary | ICD-10-CM

## 2021-01-27 PROCEDURE — 99999 PR PBB SHADOW E&M-EST. PATIENT-LVL III: CPT | Mod: PBBFAC,,, | Performed by: NURSE PRACTITIONER

## 2021-01-27 PROCEDURE — 99213 OFFICE O/P EST LOW 20 MIN: CPT | Mod: PBBFAC | Performed by: NURSE PRACTITIONER

## 2021-01-27 PROCEDURE — 99999 PR PBB SHADOW E&M-EST. PATIENT-LVL III: ICD-10-PCS | Mod: PBBFAC,,, | Performed by: NURSE PRACTITIONER

## 2021-01-27 PROCEDURE — 99213 OFFICE O/P EST LOW 20 MIN: CPT | Mod: S$PBB,,, | Performed by: NURSE PRACTITIONER

## 2021-01-27 PROCEDURE — 99213 PR OFFICE/OUTPT VISIT, EST, LEVL III, 20-29 MIN: ICD-10-PCS | Mod: S$PBB,,, | Performed by: NURSE PRACTITIONER

## 2021-01-28 ENCOUNTER — HOSPITAL ENCOUNTER (OUTPATIENT)
Dept: RADIOLOGY | Facility: HOSPITAL | Age: 31
Discharge: HOME | End: 2021-01-28
Attending: NURSE PRACTITIONER
Payer: MEDICAID

## 2021-01-28 DIAGNOSIS — R10.2 PELVIC PAIN IN FEMALE: ICD-10-CM

## 2021-01-28 PROCEDURE — 76856 US EXAM PELVIC COMPLETE: CPT | Mod: TC

## 2021-01-28 PROCEDURE — 76856 US PELVIS COMP WITH TRANSVAG NON-OB (XPD): ICD-10-PCS | Mod: 26,,, | Performed by: RADIOLOGY

## 2021-01-28 PROCEDURE — 76830 US PELVIS COMP WITH TRANSVAG NON-OB (XPD): ICD-10-PCS | Mod: 26,,, | Performed by: RADIOLOGY

## 2021-01-28 PROCEDURE — 76856 US EXAM PELVIC COMPLETE: CPT | Mod: 26,,, | Performed by: RADIOLOGY

## 2021-01-28 PROCEDURE — 76830 TRANSVAGINAL US NON-OB: CPT | Mod: 26,,, | Performed by: RADIOLOGY

## 2021-02-01 ENCOUNTER — PATIENT MESSAGE (OUTPATIENT)
Dept: OBSTETRICS AND GYNECOLOGY | Facility: CLINIC | Age: 31
End: 2021-02-01

## 2021-03-25 ENCOUNTER — PATIENT MESSAGE (OUTPATIENT)
Dept: ADMINISTRATIVE | Facility: HOSPITAL | Age: 31
End: 2021-03-25

## 2021-11-04 ENCOUNTER — OFFICE VISIT (OUTPATIENT)
Dept: INTERNAL MEDICINE | Facility: CLINIC | Age: 31
End: 2021-11-04
Payer: MEDICAID

## 2021-11-04 DIAGNOSIS — H60.543 ECZEMA OF EXTERNAL EAR, BILATERAL: Primary | ICD-10-CM

## 2021-11-04 PROCEDURE — 99213 OFFICE O/P EST LOW 20 MIN: CPT | Mod: 95,,, | Performed by: FAMILY MEDICINE

## 2021-11-04 PROCEDURE — 99213 PR OFFICE/OUTPT VISIT, EST, LEVL III, 20-29 MIN: ICD-10-PCS | Mod: 95,,, | Performed by: FAMILY MEDICINE

## 2021-11-11 ENCOUNTER — OFFICE VISIT (OUTPATIENT)
Dept: OTOLARYNGOLOGY | Facility: CLINIC | Age: 31
End: 2021-11-11
Payer: MEDICAID

## 2021-11-11 VITALS — BODY MASS INDEX: 27.77 KG/M2 | HEIGHT: 70 IN | WEIGHT: 194 LBS

## 2021-11-11 DIAGNOSIS — H60.63 CHRONIC OTITIS EXTERNA OF BOTH EARS, UNSPECIFIED TYPE: Primary | ICD-10-CM

## 2021-11-11 DIAGNOSIS — H60.543 ECZEMA OF EXTERNAL EAR, BILATERAL: ICD-10-CM

## 2021-11-11 PROCEDURE — 99203 OFFICE O/P NEW LOW 30 MIN: CPT | Mod: S$PBB,,, | Performed by: STUDENT IN AN ORGANIZED HEALTH CARE EDUCATION/TRAINING PROGRAM

## 2021-11-11 PROCEDURE — 99213 OFFICE O/P EST LOW 20 MIN: CPT | Mod: PBBFAC | Performed by: STUDENT IN AN ORGANIZED HEALTH CARE EDUCATION/TRAINING PROGRAM

## 2021-11-11 PROCEDURE — 99999 PR PBB SHADOW E&M-EST. PATIENT-LVL III: ICD-10-PCS | Mod: PBBFAC,,, | Performed by: STUDENT IN AN ORGANIZED HEALTH CARE EDUCATION/TRAINING PROGRAM

## 2021-11-11 PROCEDURE — 92504 EAR MICROSCOPY EXAMINATION: CPT | Mod: S$PBB,,, | Performed by: STUDENT IN AN ORGANIZED HEALTH CARE EDUCATION/TRAINING PROGRAM

## 2021-11-11 PROCEDURE — 92504 EAR MICROSCOPY EXAMINATION: CPT | Mod: PBBFAC | Performed by: STUDENT IN AN ORGANIZED HEALTH CARE EDUCATION/TRAINING PROGRAM

## 2021-11-11 PROCEDURE — 99203 PR OFFICE/OUTPT VISIT, NEW, LEVL III, 30-44 MIN: ICD-10-PCS | Mod: S$PBB,,, | Performed by: STUDENT IN AN ORGANIZED HEALTH CARE EDUCATION/TRAINING PROGRAM

## 2021-11-11 PROCEDURE — 99999 PR PBB SHADOW E&M-EST. PATIENT-LVL III: CPT | Mod: PBBFAC,,, | Performed by: STUDENT IN AN ORGANIZED HEALTH CARE EDUCATION/TRAINING PROGRAM

## 2021-11-11 PROCEDURE — 92504 PR EAR MICROSCOPY EXAMINATION: ICD-10-PCS | Mod: S$PBB,,, | Performed by: STUDENT IN AN ORGANIZED HEALTH CARE EDUCATION/TRAINING PROGRAM

## 2021-12-21 ENCOUNTER — OFFICE VISIT (OUTPATIENT)
Dept: INTERNAL MEDICINE | Facility: CLINIC | Age: 31
End: 2021-12-21
Payer: MEDICAID

## 2021-12-21 VITALS
OXYGEN SATURATION: 100 % | HEART RATE: 103 BPM | WEIGHT: 188.25 LBS | HEIGHT: 70 IN | DIASTOLIC BLOOD PRESSURE: 70 MMHG | BODY MASS INDEX: 26.95 KG/M2 | SYSTOLIC BLOOD PRESSURE: 116 MMHG

## 2021-12-21 DIAGNOSIS — K64.4 EXTERNAL HEMORRHOIDS WITHOUT COMPLICATION: Primary | ICD-10-CM

## 2021-12-21 PROCEDURE — 99213 OFFICE O/P EST LOW 20 MIN: CPT | Mod: PBBFAC | Performed by: PHYSICIAN ASSISTANT

## 2021-12-21 PROCEDURE — 99999 PR PBB SHADOW E&M-EST. PATIENT-LVL III: ICD-10-PCS | Mod: PBBFAC,,, | Performed by: PHYSICIAN ASSISTANT

## 2021-12-21 PROCEDURE — 99213 OFFICE O/P EST LOW 20 MIN: CPT | Mod: S$PBB,,, | Performed by: PHYSICIAN ASSISTANT

## 2021-12-21 PROCEDURE — 99213 PR OFFICE/OUTPT VISIT, EST, LEVL III, 20-29 MIN: ICD-10-PCS | Mod: S$PBB,,, | Performed by: PHYSICIAN ASSISTANT

## 2021-12-21 PROCEDURE — 99999 PR PBB SHADOW E&M-EST. PATIENT-LVL III: CPT | Mod: PBBFAC,,, | Performed by: PHYSICIAN ASSISTANT

## 2021-12-21 RX ORDER — HYDROCORTISONE 25 MG/G
CREAM TOPICAL 2 TIMES DAILY
Qty: 28 G | Refills: 1 | Status: SHIPPED | OUTPATIENT
Start: 2021-12-21 | End: 2022-09-20

## 2021-12-26 ENCOUNTER — PATIENT MESSAGE (OUTPATIENT)
Dept: INTERNAL MEDICINE | Facility: CLINIC | Age: 31
End: 2021-12-26
Payer: MEDICAID

## 2021-12-26 DIAGNOSIS — K64.4 EXTERNAL HEMORRHOIDS WITHOUT COMPLICATION: Primary | ICD-10-CM

## 2022-06-21 ENCOUNTER — PATIENT MESSAGE (OUTPATIENT)
Dept: OBSTETRICS AND GYNECOLOGY | Facility: CLINIC | Age: 32
End: 2022-06-21
Payer: MEDICAID

## 2022-07-14 ENCOUNTER — OFFICE VISIT (OUTPATIENT)
Dept: OBSTETRICS AND GYNECOLOGY | Facility: CLINIC | Age: 32
End: 2022-07-14
Payer: MEDICAID

## 2022-07-14 VITALS
BODY MASS INDEX: 28.56 KG/M2 | WEIGHT: 199.5 LBS | SYSTOLIC BLOOD PRESSURE: 106 MMHG | HEIGHT: 70 IN | DIASTOLIC BLOOD PRESSURE: 70 MMHG

## 2022-07-14 DIAGNOSIS — Z30.2 ENCOUNTER FOR FEMALE STERILIZATION PROCEDURE: Primary | ICD-10-CM

## 2022-07-14 DIAGNOSIS — Z30.09 STERILIZATION CONSULT: Primary | ICD-10-CM

## 2022-07-14 PROCEDURE — 99999 PR PBB SHADOW E&M-EST. PATIENT-LVL II: ICD-10-PCS | Mod: PBBFAC,,, | Performed by: OBSTETRICS & GYNECOLOGY

## 2022-07-14 PROCEDURE — 1159F MED LIST DOCD IN RCRD: CPT | Mod: CPTII,,, | Performed by: OBSTETRICS & GYNECOLOGY

## 2022-07-14 PROCEDURE — 3078F PR MOST RECENT DIASTOLIC BLOOD PRESSURE < 80 MM HG: ICD-10-PCS | Mod: CPTII,,, | Performed by: OBSTETRICS & GYNECOLOGY

## 2022-07-14 PROCEDURE — 3008F BODY MASS INDEX DOCD: CPT | Mod: CPTII,,, | Performed by: OBSTETRICS & GYNECOLOGY

## 2022-07-14 PROCEDURE — 3074F PR MOST RECENT SYSTOLIC BLOOD PRESSURE < 130 MM HG: ICD-10-PCS | Mod: CPTII,,, | Performed by: OBSTETRICS & GYNECOLOGY

## 2022-07-14 PROCEDURE — 3008F PR BODY MASS INDEX (BMI) DOCUMENTED: ICD-10-PCS | Mod: CPTII,,, | Performed by: OBSTETRICS & GYNECOLOGY

## 2022-07-14 PROCEDURE — 99213 OFFICE O/P EST LOW 20 MIN: CPT | Mod: S$PBB,,, | Performed by: OBSTETRICS & GYNECOLOGY

## 2022-07-14 PROCEDURE — 3074F SYST BP LT 130 MM HG: CPT | Mod: CPTII,,, | Performed by: OBSTETRICS & GYNECOLOGY

## 2022-07-14 PROCEDURE — 99999 PR PBB SHADOW E&M-EST. PATIENT-LVL II: CPT | Mod: PBBFAC,,, | Performed by: OBSTETRICS & GYNECOLOGY

## 2022-07-14 PROCEDURE — 99212 OFFICE O/P EST SF 10 MIN: CPT | Mod: PBBFAC | Performed by: OBSTETRICS & GYNECOLOGY

## 2022-07-14 PROCEDURE — 3078F DIAST BP <80 MM HG: CPT | Mod: CPTII,,, | Performed by: OBSTETRICS & GYNECOLOGY

## 2022-07-14 PROCEDURE — 1159F PR MEDICATION LIST DOCUMENTED IN MEDICAL RECORD: ICD-10-PCS | Mod: CPTII,,, | Performed by: OBSTETRICS & GYNECOLOGY

## 2022-07-14 PROCEDURE — 99213 PR OFFICE/OUTPT VISIT, EST, LEVL III, 20-29 MIN: ICD-10-PCS | Mod: S$PBB,,, | Performed by: OBSTETRICS & GYNECOLOGY

## 2022-07-15 NOTE — PROGRESS NOTES
Subjective:       Patient ID: Donna Kwong is a 31 y.o. female.    Chief Complaint:  Sterilization consult    History of Present Illness  HPI  pt presents to discuss tubal ligation.    GYN & OB History  Patient's last menstrual period was 06/10/2022.   Date of Last Pap: 2020    OB History    Para Term  AB Living   4 3 3 0 1 3   SAB IAB Ectopic Multiple Live Births   1 0 0 0 3      # Outcome Date GA Lbr Prosper/2nd Weight Sex Delivery Anes PTL Lv   4 Term 17 39w6d / 00:12 3.67 kg (8 lb 1.5 oz) M Vag-Spont EPI N CONSTANTINO   3 SAB      SAB      2 Term 13   2.722 kg (6 lb) F Vag-Spont   CONSTANTINO   1 Term 04/15/11   2.722 kg (6 lb) F Vag-Spont   CONSTANTINO       Review of Systems  Review of Systems   Constitutional: Negative.    Gastrointestinal: Negative.    Genitourinary: Negative.        Objective:     Physical Exam  Constitutional:       Appearance: Normal appearance.   Pulmonary:      Effort: Pulmonary effort is normal.   Musculoskeletal:         General: Normal range of motion.   Skin:     General: Skin is warm and dry.   Neurological:      General: No focal deficit present.      Mental Status: She is alert and oriented to person, place, and time.   Psychiatric:         Mood and Affect: Mood normal.         Behavior: Behavior normal.          Assessment:        1. Sterilization consult         Plan:      1. R/b/a/se of permanent sterilization discussed-pt confirms desire

## 2022-07-20 ENCOUNTER — OFFICE VISIT (OUTPATIENT)
Dept: OBSTETRICS AND GYNECOLOGY | Facility: CLINIC | Age: 32
End: 2022-07-20
Payer: MEDICAID

## 2022-07-20 VITALS
DIASTOLIC BLOOD PRESSURE: 64 MMHG | WEIGHT: 204.13 LBS | SYSTOLIC BLOOD PRESSURE: 118 MMHG | HEIGHT: 70 IN | BODY MASS INDEX: 29.22 KG/M2

## 2022-07-20 DIAGNOSIS — Z30.2 ENCOUNTER FOR STERILIZATION: ICD-10-CM

## 2022-07-20 DIAGNOSIS — Z30.09 STERILIZATION CONSULT: Primary | ICD-10-CM

## 2022-07-20 PROCEDURE — 3074F SYST BP LT 130 MM HG: CPT | Mod: CPTII,,, | Performed by: OBSTETRICS & GYNECOLOGY

## 2022-07-20 PROCEDURE — 3008F BODY MASS INDEX DOCD: CPT | Mod: CPTII,,, | Performed by: OBSTETRICS & GYNECOLOGY

## 2022-07-20 PROCEDURE — 3078F DIAST BP <80 MM HG: CPT | Mod: CPTII,,, | Performed by: OBSTETRICS & GYNECOLOGY

## 2022-07-20 PROCEDURE — 99213 OFFICE O/P EST LOW 20 MIN: CPT | Mod: PBBFAC | Performed by: OBSTETRICS & GYNECOLOGY

## 2022-07-20 PROCEDURE — 99999 PR PBB SHADOW E&M-EST. PATIENT-LVL III: ICD-10-PCS | Mod: PBBFAC,,, | Performed by: OBSTETRICS & GYNECOLOGY

## 2022-07-20 PROCEDURE — 99999 PR PBB SHADOW E&M-EST. PATIENT-LVL III: CPT | Mod: PBBFAC,,, | Performed by: OBSTETRICS & GYNECOLOGY

## 2022-07-20 PROCEDURE — 3078F PR MOST RECENT DIASTOLIC BLOOD PRESSURE < 80 MM HG: ICD-10-PCS | Mod: CPTII,,, | Performed by: OBSTETRICS & GYNECOLOGY

## 2022-07-20 PROCEDURE — 3074F PR MOST RECENT SYSTOLIC BLOOD PRESSURE < 130 MM HG: ICD-10-PCS | Mod: CPTII,,, | Performed by: OBSTETRICS & GYNECOLOGY

## 2022-07-20 PROCEDURE — 99213 PR OFFICE/OUTPT VISIT, EST, LEVL III, 20-29 MIN: ICD-10-PCS | Mod: S$PBB,,, | Performed by: OBSTETRICS & GYNECOLOGY

## 2022-07-20 PROCEDURE — 3008F PR BODY MASS INDEX (BMI) DOCUMENTED: ICD-10-PCS | Mod: CPTII,,, | Performed by: OBSTETRICS & GYNECOLOGY

## 2022-07-20 PROCEDURE — 99213 OFFICE O/P EST LOW 20 MIN: CPT | Mod: S$PBB,,, | Performed by: OBSTETRICS & GYNECOLOGY

## 2022-07-22 RX ORDER — MUPIROCIN 20 MG/G
OINTMENT TOPICAL
Status: CANCELLED | OUTPATIENT
Start: 2022-07-22

## 2022-07-22 RX ORDER — SODIUM CHLORIDE 9 MG/ML
INJECTION, SOLUTION INTRAVENOUS CONTINUOUS
Status: CANCELLED | OUTPATIENT
Start: 2022-07-22

## 2022-07-22 NOTE — H&P
Donna CHUNG Pipps 31 y.o.  presents for permanent sterilization    Past Medical History:   Diagnosis Date    ADHD (attention deficit hyperactivity disorder)     Epilepsy 2013 10:21:01     [facility] Historical - Neurologic: Seizure disorder;w/o mention intractable epilepsy-No Additional Notes    Seizures     Tobacco use disorder        Past Surgical History:   Procedure Laterality Date    TONSILLECTOMY         Family History   Problem Relation Age of Onset    Hypertension Father     Hepatitis Father         Hepatitis C    Hearing loss Other         no inner ear drum     Breast cancer Neg Hx     Colon cancer Neg Hx     Ovarian cancer Neg Hx        Social History     Socioeconomic History    Marital status: Single   Tobacco Use    Smoking status: Current Some Day Smoker     Packs/day: 0.25     Years: 6.00     Pack years: 1.50     Types: Cigarettes    Smokeless tobacco: Current User     Last attempt to quit: 1/10/2017   Substance and Sexual Activity    Alcohol use: Yes     Alcohol/week: 0.0 standard drinks    Drug use: No    Sexual activity: Yes     Partners: Male     Birth control/protection: Implant, None     Social Determinants of Health     Financial Resource Strain: High Risk    Difficulty of Paying Living Expenses: Hard   Food Insecurity: Food Insecurity Present    Worried About Running Out of Food in the Last Year: Sometimes true    Ran Out of Food in the Last Year: Never true   Transportation Needs: No Transportation Needs    Lack of Transportation (Medical): No    Lack of Transportation (Non-Medical): No   Physical Activity: Sufficiently Active    Days of Exercise per Week: 5 days    Minutes of Exercise per Session: 150+ min   Stress: Stress Concern Present    Feeling of Stress : Rather much   Social Connections: Unknown    Frequency of Communication with Friends and Family: Once a week    Frequency of Social Gatherings with Friends and Family: Once a week    Active  "Member of Clubs or Organizations: No    Attends Club or Organization Meetings: Never    Marital Status: Never    Housing Stability: High Risk    Unable to Pay for Housing in the Last Year: Yes    Number of Places Lived in the Last Year: 1    Unstable Housing in the Last Year: No       Current Outpatient Medications   Medication Sig Dispense Refill    etonogestreL (NEXPLANON) 68 mg Impl subdermal device by Subdermal route.      hydrocortisone 2.5 % cream Apply topically 2 (two) times daily. (Patient not taking: Reported on 2022) 28 g 1     No current facility-administered medications for this visit.       Review of patient's allergies indicates:  No Known Allergies    OB History    Para Term  AB Living   4 3 3 0 1 3   SAB IAB Ectopic Multiple Live Births   1 0 0 0 3      # Outcome Date GA Lbr Prosper/2nd Weight Sex Delivery Anes PTL Lv   4 Term 17 39w6d / 00:12 3.67 kg (8 lb 1.5 oz) M Vag-Spont EPI N CONSTANTINO   3 2014     SAB      2 Term 13   2.722 kg (6 lb) F Vag-Spont   CONSTANTINO   1 Term 04/15/11   2.722 kg (6 lb) F Vag-Spont   CONSTANTINO     Gyn history: no abnormal pap/std    Vitals:    22 1419   BP: 118/64   Weight: 92.6 kg (204 lb 2.3 oz)   Height: 5' 10" (1.778 m)     PE:  GENERAL: NAD, A&O  CHEST: normal effort  ABDOMEN: soft, NT/ND  : normal external genitalia/bimanual exam  EXT: normal ROM    A/P  1. Desires permanent sterilization  2. To OR for lap salpingectomy  "

## 2022-07-22 NOTE — PROGRESS NOTES
Subjective:       Patient ID: Donna Kwong is a 31 y.o. female.    Chief Complaint:  Pre-op exam    History of Present Illness  HPI  pt desires permanent sterlization    GYN & OB History  Patient's last menstrual period was 2022.   Date of Last Pap: 2020    OB History    Para Term  AB Living   4 3 3 0 1 3   SAB IAB Ectopic Multiple Live Births   1 0 0 0 3      # Outcome Date GA Lbr Prosper/2nd Weight Sex Delivery Anes PTL Lv   4 Term 17 39w6d / 00:12 3.67 kg (8 lb 1.5 oz) M Vag-Spont EPI N CONSTANTINO   3 SAB      SAB      2 Term 13   2.722 kg (6 lb) F Vag-Spont   CONSTANTINO   1 Term 04/15/11   2.722 kg (6 lb) F Vag-Spont   CONSTANTINO       Review of Systems  Review of Systems   Constitutional: Negative.    Gastrointestinal: Negative.    Genitourinary: Negative.        Objective:     Physical Exam  Constitutional:       Appearance: Normal appearance.   Pulmonary:      Effort: Pulmonary effort is normal.   Genitourinary:     General: Normal vulva.      Vagina: No vaginal discharge.      Comments: Normal uterus, no adnexal masses  Musculoskeletal:         General: Normal range of motion.   Skin:     General: Skin is warm and dry.   Neurological:      General: No focal deficit present.      Mental Status: She is alert and oriented to person, place, and time.   Psychiatric:         Mood and Affect: Mood normal.         Behavior: Behavior normal.          Assessment:        1. Sterilization consult         Plan:      1. Informed consents obtained-to OR for lap salpingectomy

## 2022-08-23 NOTE — PRE ADMISSION SCREENING
Pre op instructions reviewed with patient per phone on 8/23/22: Spoke about pre op process and surgery instructions, verbalized understanding.    Surgery is scheduled on 9/06. Please arrive at 10:15. We will call you the afternoon prior to surgery to confirm arrival time, as it is subject to change due to cancellations & emergencies.    Please report to the [facility] (1st Floor) at [facility] located off of [facility] (2nd building on the left, in front of the [facility]),address: [address] [place] [address] [place]. [address]    Your Covid Test appointment is scheduled on Rapid in Preop.    INSTRUCTIONS IMPORTANT!!!  Do Not Eat, Drink, or Smoke after 12 midnight! NO WATER after midnight! OK to brush teeth, no gum, candy or mints!    Take only these medicines with a small swallow of water-morning of surgery:  none    ____  NO Acrylic/fake nails or nail polish worn day of surgery (specifically hand/arm & foot surgeries).  ____  NO powder, lotions, deodorants, oils or creams on body.  ____  Please Remove All jewelry & piercings prior to surgery.  ____  Please Remove Dentures, Hearing Aids & Contact Lens prior to the start of surgery.  ____  Please bring photo ID and insurance information to hospital (Leave Valuables at Home).  ____  If going home the same day, arrange for a ride home. You will not be able to drive 24 hrs if Anesthesia was used.   ____  Females (ages 11-60) may need to give a urine sample the morning of surgery; please see Pre op Nurse prior to voiding.  ____  Wear clean, loose fitting clothing. Allow for dressings, bandages.  ____  Stop all Aspirin products, Ibuprofen, Advil, Motrin & Aleve at least 5-7 days before surgery, unless otherwise instructed by your doctor, or the nurse.   ____  Blood Thinners are stopped based on your Provider's recommendation; Call Surgeon's Office to inquire when to stop/hold.  ____  Stop taking any Fish Oil supplements or Vitamins at least 5 days prior to surgery,  unless instructed otherwise by your Doctor.            Diabetic Patients: If you take diabetic medication, do NOT take morning of surgery unless instructed by             Doctor. Metformin to be stopped 24 hrs prior to surgery time. DO NOT take long-acting insulin the evening before surgery. Blood sugars will be checked in pre-op morning of.    Bathing Instructions:    -Do not shave your face or body the day before or the day of surgery.              -Do not shave abdominal area prior to surgery   -Shower & Rinse your body as usual with anti-bacterial Soap (Dial)              -Rinse your body thoroughly.     [facility] Visitor/Ride Policy:  Only 2 adults allowed (over the age of 18) to accompany you to the Hospital. You Must have a ride home from a responsible adult that you know and trust. Medical Transport, Uber or Lyft can only be used if patient has a responsible adult to accompany them during ride home.    Post-Op Instructions: You will receive Post-op/Discharge instructions by your Discharge Nurse prior to going home. Please call your Surgeon's office with any post-surgery questions/concerns.    *Call [facility] Pre-Admissions Department with surgery instruction questions @ [phone] or [phone] (Mon-Fri 8 am to 4 pm)    *If you are running late or have questions the morning of surgery, please call the Surgery Dept @ [phone]  *Insurance/ Financial Questions, please call [phone].

## 2022-08-29 ENCOUNTER — LAB VISIT (OUTPATIENT)
Dept: LAB | Facility: HOSPITAL | Age: 32
End: 2022-08-29
Attending: PHYSICIAN ASSISTANT
Payer: MEDICAID

## 2022-08-29 DIAGNOSIS — Z30.2 ENCOUNTER FOR FEMALE STERILIZATION PROCEDURE: ICD-10-CM

## 2022-08-29 LAB
ANION GAP SERPL CALC-SCNC: 6 MMOL/L (ref 8–16)
BASOPHILS # BLD AUTO: 0.03 K/UL (ref 0–0.2)
BASOPHILS NFR BLD: 0.5 % (ref 0–1.9)
BUN SERPL-MCNC: 10 MG/DL (ref 6–20)
CALCIUM SERPL-MCNC: 9.3 MG/DL (ref 8.7–10.5)
CHLORIDE SERPL-SCNC: 106 MMOL/L (ref 95–110)
CO2 SERPL-SCNC: 25 MMOL/L (ref 23–29)
CREAT SERPL-MCNC: 0.7 MG/DL (ref 0.5–1.4)
DIFFERENTIAL METHOD: ABNORMAL
EOSINOPHIL # BLD AUTO: 0.1 K/UL (ref 0–0.5)
EOSINOPHIL NFR BLD: 1.2 % (ref 0–8)
ERYTHROCYTE [DISTWIDTH] IN BLOOD BY AUTOMATED COUNT: 12 % (ref 11.5–14.5)
EST. GFR  (NO RACE VARIABLE): >60 ML/MIN/1.73 M^2
GLUCOSE SERPL-MCNC: 96 MG/DL (ref 70–110)
HCT VFR BLD AUTO: 38.2 % (ref 37–48.5)
HGB BLD-MCNC: 13.7 G/DL (ref 12–16)
IMM GRANULOCYTES # BLD AUTO: 0.01 K/UL (ref 0–0.04)
IMM GRANULOCYTES NFR BLD AUTO: 0.2 % (ref 0–0.5)
LYMPHOCYTES # BLD AUTO: 1.4 K/UL (ref 1–4.8)
LYMPHOCYTES NFR BLD: 22.9 % (ref 18–48)
MCH RBC QN AUTO: 33 PG (ref 27–31)
MCHC RBC AUTO-ENTMCNC: 35.9 G/DL (ref 32–36)
MCV RBC AUTO: 92 FL (ref 82–98)
MONOCYTES # BLD AUTO: 0.5 K/UL (ref 0.3–1)
MONOCYTES NFR BLD: 7.8 % (ref 4–15)
NEUTROPHILS # BLD AUTO: 4.1 K/UL (ref 1.8–7.7)
NEUTROPHILS NFR BLD: 67.4 % (ref 38–73)
NRBC BLD-RTO: 0 /100 WBC
PLATELET # BLD AUTO: 182 K/UL (ref 150–450)
PMV BLD AUTO: 11.9 FL (ref 9.2–12.9)
POTASSIUM SERPL-SCNC: 4.1 MMOL/L (ref 3.5–5.1)
RBC # BLD AUTO: 4.15 M/UL (ref 4–5.4)
SODIUM SERPL-SCNC: 137 MMOL/L (ref 136–145)
WBC # BLD AUTO: 6.02 K/UL (ref 3.9–12.7)

## 2022-08-29 PROCEDURE — 36415 COLL VENOUS BLD VENIPUNCTURE: CPT | Performed by: PHYSICIAN ASSISTANT

## 2022-08-29 PROCEDURE — 80048 BASIC METABOLIC PNL TOTAL CA: CPT | Performed by: PHYSICIAN ASSISTANT

## 2022-08-29 PROCEDURE — 85025 COMPLETE CBC W/AUTO DIFF WBC: CPT | Performed by: PHYSICIAN ASSISTANT

## 2022-09-02 ENCOUNTER — TELEPHONE (OUTPATIENT)
Dept: PREADMISSION TESTING | Facility: HOSPITAL | Age: 32
End: 2022-09-02
Payer: MEDICAID

## 2022-09-02 NOTE — TELEPHONE ENCOUNTER
Called and spoke with pt about the following:     Please arrive to [facility] ([facility] main lobby at 0945 am on 9/6/22 for your scheduled procedure. NOTHING to eat or drink after midnight unless instructed otherwise by your Surgeon. Take only the medications instructed by your Pre Admit Nurse with small sip of water.    Thank you,  -Pre Admit Testing Dept.  Mon-Fri 8 am - 4 pm [phone]

## 2022-09-06 ENCOUNTER — ANESTHESIA EVENT (OUTPATIENT)
Dept: SURGERY | Facility: HOSPITAL | Age: 32
End: 2022-09-06
Payer: MEDICAID

## 2022-09-06 ENCOUNTER — ANESTHESIA (OUTPATIENT)
Dept: SURGERY | Facility: HOSPITAL | Age: 32
End: 2022-09-06
Payer: MEDICAID

## 2022-09-06 ENCOUNTER — HOSPITAL ENCOUNTER (OUTPATIENT)
Facility: HOSPITAL | Age: 32
Discharge: HOME | End: 2022-09-06
Attending: OBSTETRICS & GYNECOLOGY | Admitting: OBSTETRICS & GYNECOLOGY
Payer: MEDICAID

## 2022-09-06 DIAGNOSIS — Z30.2 ENCOUNTER FOR STERILIZATION: ICD-10-CM

## 2022-09-06 DIAGNOSIS — Z30.09 STERILIZATION CONSULT: ICD-10-CM

## 2022-09-06 LAB
B-HCG UR QL: NEGATIVE
CTP QC/QA: YES
CTP QC/QA: YES
SARS-COV-2 AG RESP QL IA.RAPID: NEGATIVE

## 2022-09-06 PROCEDURE — 11982 REMOVE DRUG IMPLANT DEVICE: CPT | Mod: 51,,, | Performed by: OBSTETRICS & GYNECOLOGY

## 2022-09-06 PROCEDURE — 58661 PR LAP,RMV  ADNEXAL STRUCTURE: ICD-10-PCS | Mod: 50,,, | Performed by: OBSTETRICS & GYNECOLOGY

## 2022-09-06 PROCEDURE — 25000003 PHARM REV CODE 250: Performed by: NURSE ANESTHETIST, CERTIFIED REGISTERED

## 2022-09-06 PROCEDURE — 88302 TISSUE EXAM BY PATHOLOGIST: CPT | Mod: 59 | Performed by: PATHOLOGY

## 2022-09-06 PROCEDURE — 58661 LAPAROSCOPY REMOVE ADNEXA: CPT | Mod: AS,50,, | Performed by: PHYSICIAN ASSISTANT

## 2022-09-06 PROCEDURE — 71000033 HC RECOVERY, INTIAL HOUR: Performed by: OBSTETRICS & GYNECOLOGY

## 2022-09-06 PROCEDURE — 63600175 PHARM REV CODE 636 W HCPCS: Performed by: NURSE ANESTHETIST, CERTIFIED REGISTERED

## 2022-09-06 PROCEDURE — 37000009 HC ANESTHESIA EA ADD 15 MINS: Performed by: OBSTETRICS & GYNECOLOGY

## 2022-09-06 PROCEDURE — 88302 TISSUE EXAM BY PATHOLOGIST: CPT | Mod: 26,,, | Performed by: PATHOLOGY

## 2022-09-06 PROCEDURE — 71000015 HC POSTOP RECOV 1ST HR: Performed by: OBSTETRICS & GYNECOLOGY

## 2022-09-06 PROCEDURE — 71000039 HC RECOVERY, EACH ADD'L HOUR: Performed by: OBSTETRICS & GYNECOLOGY

## 2022-09-06 PROCEDURE — 88300 SURGICAL PATH GROSS: CPT | Mod: 26,,, | Performed by: PATHOLOGY

## 2022-09-06 PROCEDURE — 27201423 OPTIME MED/SURG SUP & DEVICES STERILE SUPPLY: Performed by: OBSTETRICS & GYNECOLOGY

## 2022-09-06 PROCEDURE — 00840 ANES IPER PX LOWER ABD NOS: CPT | Performed by: OBSTETRICS & GYNECOLOGY

## 2022-09-06 PROCEDURE — 36000708 HC OR TIME LEV III 1ST 15 MIN: Performed by: OBSTETRICS & GYNECOLOGY

## 2022-09-06 PROCEDURE — 88300 PR  SURG PATH,GROSS,LEVEL I: ICD-10-PCS | Mod: 26,,, | Performed by: PATHOLOGY

## 2022-09-06 PROCEDURE — 88300 SURGICAL PATH GROSS: CPT | Performed by: PATHOLOGY

## 2022-09-06 PROCEDURE — 88302 PR  SURG PATH,LEVEL II: ICD-10-PCS | Mod: 26,,, | Performed by: PATHOLOGY

## 2022-09-06 PROCEDURE — 58661 LAPAROSCOPY REMOVE ADNEXA: CPT | Mod: 50,,, | Performed by: OBSTETRICS & GYNECOLOGY

## 2022-09-06 PROCEDURE — 63600175 PHARM REV CODE 636 W HCPCS: Performed by: ANESTHESIOLOGY

## 2022-09-06 PROCEDURE — 11982 PR REMOVAL DRUG IMPLANT DEVICE: ICD-10-PCS | Mod: 51,,, | Performed by: OBSTETRICS & GYNECOLOGY

## 2022-09-06 PROCEDURE — 36000709 HC OR TIME LEV III EA ADD 15 MIN: Performed by: OBSTETRICS & GYNECOLOGY

## 2022-09-06 PROCEDURE — 81025 URINE PREGNANCY TEST: CPT | Performed by: OBSTETRICS & GYNECOLOGY

## 2022-09-06 PROCEDURE — 58661 PR LAP,RMV  ADNEXAL STRUCTURE: ICD-10-PCS | Mod: AS,50,, | Performed by: PHYSICIAN ASSISTANT

## 2022-09-06 PROCEDURE — 37000008 HC ANESTHESIA 1ST 15 MINUTES: Performed by: OBSTETRICS & GYNECOLOGY

## 2022-09-06 RX ORDER — SODIUM CHLORIDE 9 MG/ML
INJECTION, SOLUTION INTRAVENOUS CONTINUOUS
Status: DISCONTINUED | OUTPATIENT
Start: 2022-09-06 | End: 2022-09-06 | Stop reason: HOSPADM

## 2022-09-06 RX ORDER — DEXAMETHASONE SODIUM PHOSPHATE 4 MG/ML
INJECTION, SOLUTION INTRA-ARTICULAR; INTRALESIONAL; INTRAMUSCULAR; INTRAVENOUS; SOFT TISSUE
Status: DISCONTINUED | OUTPATIENT
Start: 2022-09-06 | End: 2022-09-06

## 2022-09-06 RX ORDER — ROCURONIUM BROMIDE 10 MG/ML
INJECTION, SOLUTION INTRAVENOUS
Status: DISCONTINUED | OUTPATIENT
Start: 2022-09-06 | End: 2022-09-06

## 2022-09-06 RX ORDER — ACETAMINOPHEN 10 MG/ML
1000 INJECTION, SOLUTION INTRAVENOUS ONCE
Status: COMPLETED | OUTPATIENT
Start: 2022-09-06 | End: 2022-09-06

## 2022-09-06 RX ORDER — MIDAZOLAM HYDROCHLORIDE 1 MG/ML
INJECTION, SOLUTION INTRAMUSCULAR; INTRAVENOUS
Status: DISCONTINUED | OUTPATIENT
Start: 2022-09-06 | End: 2022-09-06

## 2022-09-06 RX ORDER — MUPIROCIN 20 MG/G
OINTMENT TOPICAL
Status: DISCONTINUED | OUTPATIENT
Start: 2022-09-06 | End: 2022-09-06 | Stop reason: HOSPADM

## 2022-09-06 RX ORDER — ONDANSETRON 2 MG/ML
INJECTION INTRAMUSCULAR; INTRAVENOUS
Status: DISCONTINUED | OUTPATIENT
Start: 2022-09-06 | End: 2022-09-06

## 2022-09-06 RX ORDER — OXYCODONE AND ACETAMINOPHEN 5; 325 MG/1; MG/1
1 TABLET ORAL
Status: DISCONTINUED | OUTPATIENT
Start: 2022-09-06 | End: 2022-09-06 | Stop reason: HOSPADM

## 2022-09-06 RX ORDER — PROPOFOL 10 MG/ML
VIAL (ML) INTRAVENOUS
Status: DISCONTINUED | OUTPATIENT
Start: 2022-09-06 | End: 2022-09-06

## 2022-09-06 RX ORDER — FENTANYL CITRATE 50 UG/ML
INJECTION, SOLUTION INTRAMUSCULAR; INTRAVENOUS
Status: DISCONTINUED | OUTPATIENT
Start: 2022-09-06 | End: 2022-09-06

## 2022-09-06 RX ORDER — IBUPROFEN 800 MG/1
800 TABLET ORAL 3 TIMES DAILY
Qty: 21 TABLET | Refills: 0 | Status: SHIPPED | OUTPATIENT
Start: 2022-09-06 | End: 2022-09-13

## 2022-09-06 RX ORDER — SODIUM CHLORIDE 0.9 % (FLUSH) 0.9 %
10 SYRINGE (ML) INJECTION
Status: DISCONTINUED | OUTPATIENT
Start: 2022-09-06 | End: 2022-09-06 | Stop reason: HOSPADM

## 2022-09-06 RX ORDER — PROCHLORPERAZINE EDISYLATE 5 MG/ML
5 INJECTION INTRAMUSCULAR; INTRAVENOUS ONCE
Status: COMPLETED | OUTPATIENT
Start: 2022-09-06 | End: 2022-09-06

## 2022-09-06 RX ORDER — HYDROCODONE BITARTRATE AND ACETAMINOPHEN 5; 325 MG/1; MG/1
1 TABLET ORAL
Qty: 10 TABLET | Refills: 0 | Status: SHIPPED | OUTPATIENT
Start: 2022-09-06 | End: 2023-11-27

## 2022-09-06 RX ORDER — HYDROMORPHONE HYDROCHLORIDE 2 MG/ML
0.2 INJECTION, SOLUTION INTRAMUSCULAR; INTRAVENOUS; SUBCUTANEOUS EVERY 5 MIN PRN
Status: DISCONTINUED | OUTPATIENT
Start: 2022-09-06 | End: 2022-09-06 | Stop reason: HOSPADM

## 2022-09-06 RX ORDER — KETOROLAC TROMETHAMINE 30 MG/ML
30 INJECTION, SOLUTION INTRAMUSCULAR; INTRAVENOUS ONCE
Status: COMPLETED | OUTPATIENT
Start: 2022-09-06 | End: 2022-09-06

## 2022-09-06 RX ORDER — SUCCINYLCHOLINE CHLORIDE 20 MG/ML
INJECTION INTRAMUSCULAR; INTRAVENOUS
Status: DISCONTINUED | OUTPATIENT
Start: 2022-09-06 | End: 2022-09-06

## 2022-09-06 RX ORDER — LIDOCAINE HYDROCHLORIDE 20 MG/ML
INJECTION INTRAVENOUS
Status: DISCONTINUED | OUTPATIENT
Start: 2022-09-06 | End: 2022-09-06

## 2022-09-06 RX ADMIN — SUGAMMADEX 200 MG: 100 INJECTION, SOLUTION INTRAVENOUS at 11:09

## 2022-09-06 RX ADMIN — KETOROLAC TROMETHAMINE 30 MG: 30 INJECTION, SOLUTION INTRAMUSCULAR; INTRAVENOUS at 01:09

## 2022-09-06 RX ADMIN — DEXAMETHASONE SODIUM PHOSPHATE 4 MG: 4 INJECTION, SOLUTION INTRAMUSCULAR; INTRAVENOUS at 11:09

## 2022-09-06 RX ADMIN — MIDAZOLAM 2 MG: 1 INJECTION INTRAMUSCULAR; INTRAVENOUS at 11:09

## 2022-09-06 RX ADMIN — FENTANYL CITRATE 100 MCG: 50 INJECTION, SOLUTION INTRAMUSCULAR; INTRAVENOUS at 11:09

## 2022-09-06 RX ADMIN — FENTANYL CITRATE 50 MCG: 50 INJECTION, SOLUTION INTRAMUSCULAR; INTRAVENOUS at 12:09

## 2022-09-06 RX ADMIN — ROCURONIUM BROMIDE 15 MG: 10 INJECTION, SOLUTION INTRAVENOUS at 11:09

## 2022-09-06 RX ADMIN — ACETAMINOPHEN 1000 MG: 10 INJECTION INTRAVENOUS at 01:09

## 2022-09-06 RX ADMIN — ROCURONIUM BROMIDE 20 MG: 10 INJECTION, SOLUTION INTRAVENOUS at 11:09

## 2022-09-06 RX ADMIN — SUCCINYLCHOLINE CHLORIDE 160 MG: 20 INJECTION, SOLUTION INTRAMUSCULAR; INTRAVENOUS at 11:09

## 2022-09-06 RX ADMIN — ROCURONIUM BROMIDE 5 MG: 10 INJECTION, SOLUTION INTRAVENOUS at 11:09

## 2022-09-06 RX ADMIN — SODIUM CHLORIDE, SODIUM LACTATE, POTASSIUM CHLORIDE, AND CALCIUM CHLORIDE: .6; .31; .03; .02 INJECTION, SOLUTION INTRAVENOUS at 11:09

## 2022-09-06 RX ADMIN — PROPOFOL 200 MG: 10 INJECTION, EMULSION INTRAVENOUS at 11:09

## 2022-09-06 RX ADMIN — ONDANSETRON 4 MG: 2 INJECTION, SOLUTION INTRAMUSCULAR; INTRAVENOUS at 11:09

## 2022-09-06 RX ADMIN — LIDOCAINE HYDROCHLORIDE 100 MG: 20 INJECTION, SOLUTION INTRAVENOUS at 11:09

## 2022-09-06 RX ADMIN — FENTANYL CITRATE 50 MCG: 50 INJECTION, SOLUTION INTRAMUSCULAR; INTRAVENOUS at 11:09

## 2022-09-06 RX ADMIN — PROCHLORPERAZINE EDISYLATE 5 MG: 5 INJECTION INTRAMUSCULAR; INTRAVENOUS at 12:09

## 2022-09-06 RX ADMIN — PROPOFOL 70 MG: 10 INJECTION, EMULSION INTRAVENOUS at 11:09

## 2022-09-06 RX ADMIN — HYDROMORPHONE HYDROCHLORIDE 0.2 MG: 2 INJECTION INTRAMUSCULAR; INTRAVENOUS; SUBCUTANEOUS at 12:09

## 2022-09-06 RX ADMIN — GLYCOPYRROLATE 0.2 MG: 0.2 INJECTION, SOLUTION INTRAMUSCULAR; INTRAVENOUS at 11:09

## 2022-09-06 NOTE — ANESTHESIA PREPROCEDURE EVALUATION
2022  Donna Kwong is a 31 y.o., female.      Pre-op Assessment    I have reviewed the Patient Summary Reports.     I have reviewed the Nursing Notes. I have reviewed the NPO Status.      Review of Systems  Anesthesia Hx:  No problems with previous Anesthesia    Social:  Non-Smoker, No Alcohol Use    Hematology/Oncology:  Hematology Normal   Oncology Normal     EENT/Dental:EENT/Dental Normal   Cardiovascular:   Hypertension, well controlled    Pulmonary:  Pulmonary Normal    Renal/:  Renal/ Normal     Hepatic/GI:  Hepatic/GI Normal    Musculoskeletal:  Musculoskeletal Normal    Neurological:  Neurology Normal    Endocrine:  Endocrine Normal    Dermatological:  Skin Normal    Psych:  Psychiatric Normal         Past Medical History:   Diagnosis Date    ADHD (attention deficit hyperactivity disorder)     Epilepsy 2013 10:21:01 AM    [facility] Historical - Neurologic: Seizure disorder;w/o mention intractable epilepsy-No Additional Notes    Seizures     last:     Tobacco use disorder        Past Surgical History:   Procedure Laterality Date    TONSILLECTOMY         Family History   Problem Relation Age of Onset    Hypertension Father     Hepatitis Father         Hepatitis C    Hearing loss Other         no inner ear drum     Breast cancer Neg Hx     Colon cancer Neg Hx     Ovarian cancer Neg Hx        Social History     Socioeconomic History    Marital status: Single   Tobacco Use    Smoking status: Former     Packs/day: 0.25     Years: 6.00     Pack years: 1.50     Types: Vaping w/o nicotine, Cigarettes     Quit date: 2022     Years since quittin.1    Smokeless tobacco: Current     Last attempt to quit: 1/10/2017    Tobacco comments:     hold midnight prior to sx   Substance and Sexual Activity    Alcohol use: Yes     Comment: hold 72 hrs prior to sx    Drug  use: No    Sexual activity: Yes     Partners: Male     Birth control/protection: Implant, None     Social Determinants of Health     Financial Resource Strain: High Risk    Difficulty of Paying Living Expenses: Hard   Food Insecurity: Food Insecurity Present    Worried About Running Out of Food in the Last Year: Sometimes true    Ran Out of Food in the Last Year: Never true   Transportation Needs: No Transportation Needs    Lack of Transportation (Medical): No    Lack of Transportation (Non-Medical): No   Physical Activity: Sufficiently Active    Days of Exercise per Week: 5 days    Minutes of Exercise per Session: 150+ min   Stress: Stress Concern Present    Feeling of Stress : Rather much   Social Connections: Unknown    Frequency of Communication with Friends and Family: Once a week    Frequency of Social Gatherings with Friends and Family: Once a week    Active Member of Clubs or Organizations: No    Attends Club or Organization Meetings: Never    Marital Status: Never    Housing Stability: High Risk    Unable to Pay for Housing in the Last Year: Yes    Number of Places Lived in the Last Year: 1    Unstable Housing in the Last Year: No       Current Facility-Administered Medications   Medication Dose Route Frequency Provider Last Rate Last Admin    0.9%  NaCl infusion   Intravenous Continuous Bhavna Roach MD        mupirocin 2 % ointment   Nasal On Call Procedure Bhavna Roach MD           Review of patient's allergies indicates:  No Known Allergies    Physical Exam  General: Well nourished and Alert    Airway:  Mallampati: II / II  Mouth Opening: Normal  TM Distance: Normal  Tongue: Normal  Neck ROM: Normal ROM    Dental:  Intact        Anesthesia Plan  Type of Anesthesia, risks & benefits discussed:    Anesthesia Type: Gen ETT  Intra-op Monitoring Plan: Standard ASA Monitors  Post Op Pain Control Plan: IV/PO Opioids PRN  Induction:  IV  Airway Plan: Direct  Informed Consent:  Informed consent signed with the Patient and all parties understand the risks and agree with anesthesia plan.  All questions answered.   ASA Score: 2    Ready For Surgery From Anesthesia Perspective.     .    Lab Results   Component Value Date    WBC 6.02 08/29/2022    HGB 13.7 08/29/2022    HCT 38.2 08/29/2022    MCV 92 08/29/2022     08/29/2022

## 2022-09-06 NOTE — TRANSFER OF CARE
"Anesthesia Transfer of Care Note    Patient: Donna Kwong    Procedure(s) Performed: Procedure(s) (LRB):  SALPINGECTOMY, LAPAROSCOPIC (Bilateral)  REMOVAL, IMPLANT (Left)    Patient location: PACU    Anesthesia Type: general    Transport from OR: Transported from OR on room air with adequate spontaneous ventilation    Post pain: adequate analgesia    Post assessment: no apparent anesthetic complications and tolerated procedure well    Post vital signs: stable    Level of consciousness: awake, alert and oriented    Nausea/Vomiting: no nausea/vomiting    Complications: none    Transfer of care protocol was followed      Last vitals:   Visit Vitals  /68   Pulse 63   Temp 36.7 °C (98.1 °F) (Temporal)   Resp 14   Ht 5' 10" (1.778 m)   Wt 94.3 kg (207 lb 14.3 oz)   LMP 08/02/2022   SpO2 99%   Breastfeeding No   BMI 29.83 kg/m²     "

## 2022-09-06 NOTE — ANESTHESIA PROCEDURE NOTES
Intubation    Date/Time: 9/6/2022 11:19 AM  Performed by: Brijesh Uribe CRNA  Authorized by: Stephon Parnell MD     Intubation:     Induction:  Intravenous    Intubated:  Postinduction    Mask Ventilation:  Easy mask    Attempts:  1    Attempted By:  CRNA    Method of Intubation:  Direct    Blade:  Alla 3    Laryngeal View Grade: Grade I - full view of cords      Difficult Airway Encountered?: No      Complications:  None    Airway Device:  Oral endotracheal tube    Airway Device Size:  7.0    Style/Cuff Inflation:  Cuffed (inflated to minimal occlusive pressure)    Tube secured:  22    Placement Verified By:  Capnometry    Complicating Factors:  None    Findings Post-Intubation:  BS equal bilateral

## 2022-09-06 NOTE — OP NOTE
DATE OF PROCEDURE: 9/6/22                                                                                 PREOPERATIVE DIAGNOSES:  Undesired fertility.                                                                                                             POSTOPERATIVE DIAGNOSES:  Undesired fertility.                                                                                                            PROCEDURE:  Laparoscopic bilateral salpingectomy; Nexplanon implant removal                                                                                     SURGEON:  Bhavna Roach M.D.                                                                                                                               ASSISTANT: TRINIDAD Owens (presence necessary for performance of case)       ANESTHESIA: general      IVF: 1000 mL      EBL: 10 mL      UOP: 350 mL       COMPLICATIONS:  none                                                                                                                            SPECIMENS: bilateral fallopian tubes; implant                                                                                                                                         ANESTHESIA:  General endotracheal anesthesia.                                                                                                             OPERATIVE FINDINGS:  Normal-size uterus, and bilateral adnexa, no adhesive   disease, normal liver edge   *see media tab                                                                                         DETAILS OF PROCEDURE:  After informed consent, the patient, Donna Kwong, was   brought to the Operating Room. A time out was performed and the correct patient and   procedure were confirmed. She was given general anesthesia without            difficulty, prepped and draped in sterile fashion in dorsal lithotomy        position.  We placed a weighted speculum in the  vagina.  Anterior lip of     the cervix was grasped with single-tooth tenaculum.  The uterus was          sounded appropriately.  Hegar dilators were used to dilate the cervix and a ZUMI     uterine manipulator was inserted.  We tented the umbilicus, placed a         Veress needle directly into the umbilical site.  Entry into the peritoneal          cavity was confirmed with a water-filled syringe.  We insufflated the abdomen       with CO2 gas to obtain a pneumoperitoneum.  We then removed Veress needle,        made a 5-mm incision periumbilically, and placed a 5-mm trocar and sleeve.  We    had good release of gas.  The laparoscope was inserted.  Survey of the       abdomen revealed the above findings.  Bilateral fallopian tubes were         identified and followed to the fimbria.  We placed 8-mm trocar and sleeve in right lower quadrant   under direct visualization.  5mm trocar & sleeve in left lower quadrant. Bilateral fallopian tubes were identified & transected using Harmonic ACE. All sites again were noted to be hemostatic.  The instruments were removed from the abdomen.  The CO2 gas was released.        The skin trocar incisions were closed appropriately.  All instrument and lap     counts were correct.                               Patient was noted to have birth control implant in situ. Consent was obtained from pt's father for removal.  Implant palpated left media arm. Area cleaned with betadine. Pressure applied to proximal end to elevate the distal tip. Stab incision made over the tip & implant dissected out intact without difficulty. Bandage applied

## 2022-09-06 NOTE — DISCHARGE SUMMARY
[facility] - Surgery (Hospital)  Discharge Note  Short Stay    Procedure(s) (LRB):  SALPINGECTOMY, LAPAROSCOPIC (Bilateral)  REMOVAL, IMPLANT (Left)    OUTCOME: Patient tolerated treatment/procedure well without complication and is now ready for discharge.    DISPOSITION: Home or Self Care    FINAL DIAGNOSIS:  Encounter for female sterilization procedure    FOLLOWUP: In clinic    DISCHARGE INSTRUCTIONS:  2 weeks pelvic rest       Clinical Reference Documents Added to Patient Instructions         Document    FALLOPIAN TUBE REMOVAL DISCHARGE INSTRUCTIONS (ENGLISH)    HYDROCODONE AND ACETAMINOPHEN, ADULT (ENGLISH)    IBUPROFEN, ADULT (ENGLISH)            TIME SPENT ON DISCHARGE: 5 minutes

## 2022-09-06 NOTE — ANESTHESIA POSTPROCEDURE EVALUATION
Anesthesia Post Evaluation    Patient: Donna Kwong    Procedure(s) Performed: Procedure(s) (LRB):  SALPINGECTOMY, LAPAROSCOPIC (Bilateral)  REMOVAL, IMPLANT (Left)    Final Anesthesia Type: general      Patient location during evaluation: PACU  Patient participation: Yes- Able to Participate  Level of consciousness: awake and alert  Post-procedure vital signs: reviewed and stable  Pain management: adequate  Airway patency: patent    PONV status at discharge: No PONV  Anesthetic complications: no      Cardiovascular status: blood pressure returned to baseline  Respiratory status: unassisted  Hydration status: euvolemic  Follow-up not needed.          Vitals Value Taken Time   /69 09/06/22 1248   Temp 36.4 °C (97.6 °F) 09/06/22 1230   Pulse 79 09/06/22 1249   Resp 16 09/06/22 1249   SpO2 96 % 09/06/22 1249   Vitals shown include unvalidated device data.      No case tracking events are documented in the log.      Pain/Pia Score: Ipa Score: 6 (9/6/2022 12:27 PM)

## 2022-09-06 NOTE — H&P
Donna CHUNG Pipps 31 y.o.  presents for permanent sterilization          Past Medical History:   Diagnosis Date    ADHD (attention deficit hyperactivity disorder)      Epilepsy 2013 10:21:01      DAPHNE Cid Historical - Neurologic: Seizure disorder;w/o mention intractable epilepsy-No Additional Notes    Seizures      Tobacco use disorder                 Past Surgical History:   Procedure Laterality Date    TONSILLECTOMY                   Family History   Problem Relation Age of Onset    Hypertension Father      Hepatitis Father           Hepatitis C    Hearing loss Other           no inner ear drum     Breast cancer Neg Hx      Colon cancer Neg Hx      Ovarian cancer Neg Hx           Social History            Socioeconomic History    Marital status: Single   Tobacco Use    Smoking status: Current Some Day Smoker       Packs/day: 0.25       Years: 6.00       Pack years: 1.50       Types: Cigarettes    Smokeless tobacco: Current User       Last attempt to quit: 1/10/2017   Substance and Sexual Activity    Alcohol use: Yes       Alcohol/week: 0.0 standard drinks    Drug use: No    Sexual activity: Yes       Partners: Male       Birth control/protection: Implant, None      Social Determinants of Health          Financial Resource Strain: High Risk    Difficulty of Paying Living Expenses: Hard   Food Insecurity: Food Insecurity Present    Worried About Running Out of Food in the Last Year: Sometimes true    Ran Out of Food in the Last Year: Never true   Transportation Needs: No Transportation Needs    Lack of Transportation (Medical): No    Lack of Transportation (Non-Medical): No   Physical Activity: Sufficiently Active    Days of Exercise per Week: 5 days    Minutes of Exercise per Session: 150+ min   Stress: Stress Concern Present    Feeling of Stress : Rather much   Social Connections: Unknown    Frequency of Communication with Friends and Family: Once a week    Frequency of Social Gatherings with Friends  "and Family: Once a week    Active Member of Clubs or Organizations: No    Attends Club or Organization Meetings: Never    Marital Status: Never    Housing Stability: High Risk    Unable to Pay for Housing in the Last Year: Yes    Number of Places Lived in the Last Year: 1    Unstable Housing in the Last Year: No         Current Medications          Current Outpatient Medications   Medication Sig Dispense Refill    etonogestreL (NEXPLANON) 68 mg Impl subdermal device by Subdermal route.        hydrocortisone 2.5 % cream Apply topically 2 (two) times daily. (Patient not taking: Reported on 2022) 28 g 1      No current facility-administered medications for this visit.            Review of patient's allergies indicates:  No Known Allergies                      OB History    Para Term  AB Living   4 3 3 0 1 3   SAB IAB Ectopic Multiple Live Births      1 0 0 0 3          # Outcome Date GA Lbr Prosper/2nd Weight Sex Delivery Anes PTL Lv   4 Term 17 39w6d / 00:12 3.67 kg (8 lb 1.5 oz) M Vag-Spont EPI N CONSTANTINO   3 2014         SAB         2 Term 13     2.722 kg (6 lb) F Vag-Spont     CONSTANTINO   1 Term 04/15/11     2.722 kg (6 lb) F Vag-Spont     CONSTANTINO      Gyn history: no abnormal pap/std     Vitals:    22 1539 22 0911   BP:  120/68   Pulse:  63   Resp:  14   Temp:  98.1 °F (36.7 °C)   TempSrc:  Temporal   SpO2:  99%   Weight: 95.3 kg (210 lb) 94.3 kg (207 lb 14.3 oz)   Height: 5' 10" (1.778 m) 5' 10" (1.778 m)        PE:  GENERAL: NAD, A&O  CHEST: normal effort  ABDOMEN: soft, NT/ND  : normal external genitalia/bimanual exam  EXT: normal ROM     A/P  1. Desires permanent sterilization  2. To OR for lap salpingectomy  "

## 2022-09-07 VITALS
HEART RATE: 59 BPM | TEMPERATURE: 97 F | BODY MASS INDEX: 29.76 KG/M2 | HEIGHT: 70 IN | DIASTOLIC BLOOD PRESSURE: 69 MMHG | OXYGEN SATURATION: 97 % | RESPIRATION RATE: 17 BRPM | SYSTOLIC BLOOD PRESSURE: 120 MMHG | WEIGHT: 207.88 LBS

## 2022-09-09 ENCOUNTER — NURSE TRIAGE (OUTPATIENT)
Dept: ADMINISTRATIVE | Facility: CLINIC | Age: 32
End: 2022-09-09
Payer: MEDICAID

## 2022-09-09 NOTE — TELEPHONE ENCOUNTER
Reason for Disposition   Vaginal bleeding is main symptom   Caller has NON-URGENT question and triager unable to answer question     NOT in any pain, but does have post-op bleeding    Additional Information   Negative: Sounds like a life-threatening emergency to the triager   Negative: Chest pain   Negative: Difficulty breathing   Negative: Acting confused (e.g., disoriented, slurred speech) or excessively sleepy   Negative: Surgical incision symptoms and questions   Negative: Pain or burning with passing urine (urination) and female   Negative: Constipation   Negative: New or worsening leg (calf, thigh) pain   Negative: New or worsening leg swelling   Negative: Dizziness is severe, or persists > 24 hours after surgery   Negative: Symptoms arising from use of a urinary catheter (Ibanez or Coude)   Negative: Cast problems or questions   Negative: Medication question   Negative: Bright red, wide-spread, sunburn-like rash   Negative: SEVERE headache and after spinal (epidural) anesthesia   Negative: Vomiting and persists > 4 hours   Negative: Vomiting and abdomen looks much more swollen than usual   Negative: Drinking very little and dehydration suspected (e.g., no urine > 12 hours, very dry mouth, very lightheaded)   Negative: Patient sounds very sick or weak to the triager   Negative: Sounds like a serious complication to the triager   Negative: Fever > 100.4 F (38.0 C)   Negative: SEVERE vaginal bleeding (e.g., continuous red blood from vagina, or large blood clots) and very weak (can't stand)   Negative: Passed out (i.e., fainted, collapsed and was not responding)   Negative: Difficult to awaken or acting confused (e.g., disoriented, slurred speech)   Negative: Shock suspected (e.g., cold/pale/clammy skin, too weak to stand, low BP, rapid pulse)   Negative: Sounds like a life-threatening emergency to the triager   Negative: SEVERE abdominal pain (e.g., excruciating)   Negative: SEVERE dizziness (e.g., unable to  stand, requires support to walk, feels like passing out now)   Negative: SEVERE vaginal bleeding (e.g., soaking 2 pads or tampons per hour and present 2 or more hours; 1 menstrual cup every 2 hours)   Negative: MODERATE vaginal bleeding (e.g., soaking pad or tampon per hour and present > 6 hours; 1 menstrual cup every 6 hours)   Negative: Pale skin (pallor) of new-onset or worsening   Negative: Constant abdominal pain lasting > 2 hours   Negative: Patient sounds very sick or weak to the triager   Negative: Caller has URGENT question and triager unable to answer question     Secure chat message sent to Dr Bhavna Roach with request for nurse / MD to call patient regarding her post-op vaginal bleeding.   Negative: SEVERE post-op pain (e.g., excruciating, pain scale 8-10) that is not controlled with pain medications   Negative: Headache and after spinal (epidural) anesthesia and not severe   Negative: Fever present > 3 days (72 hours)   Negative: Patient wants to be seen   Negative: MILD TO MODERATE post-op pain (e.g., pain scale 1-7) that is not controlled with pain medications    Protocols used: Post-Op Symptoms and [id number]-IX, Vaginal Symptoms-A-OH, Vaginal Bleeding - [id number]        Donna said she had salpingectomy 09/06, Dr Bhavna Roach, and yesterday began bleeding more.  Last night had to put a pad, and this morning had to change pads once since 0530.  Before this, she had not required any pad or shield.  She states her last period 3.5 weeks ago so she thinks it may be her regular period.  She said she has no cramping, no pain, not passing any clots, and she does not usually have any cramping or pain with her periods. She is at work now, has not been lifting anything greater than 10 pounds and is not required pain medication. Per [facility] triage protocol, request MD / staff call patient back today.  Please contact caller directly with any additional care advice.

## 2022-09-11 ENCOUNTER — PATIENT MESSAGE (OUTPATIENT)
Dept: OBSTETRICS AND GYNECOLOGY | Facility: CLINIC | Age: 32
End: 2022-09-11
Payer: MEDICAID

## 2022-09-12 LAB
FINAL PATHOLOGIC DIAGNOSIS: NORMAL
FINAL PATHOLOGIC DIAGNOSIS: NORMAL
GROSS: NORMAL
GROSS: NORMAL
Lab: NORMAL
Lab: NORMAL

## 2022-09-20 ENCOUNTER — OFFICE VISIT (OUTPATIENT)
Dept: OBSTETRICS AND GYNECOLOGY | Facility: CLINIC | Age: 32
End: 2022-09-20
Payer: MEDICAID

## 2022-09-20 VITALS
HEIGHT: 70 IN | DIASTOLIC BLOOD PRESSURE: 76 MMHG | SYSTOLIC BLOOD PRESSURE: 116 MMHG | WEIGHT: 208.56 LBS | BODY MASS INDEX: 29.86 KG/M2

## 2022-09-20 DIAGNOSIS — Z48.89 ENCOUNTER FOR POSTOPERATIVE WOUND CHECK: Primary | ICD-10-CM

## 2022-09-20 PROCEDURE — 3074F PR MOST RECENT SYSTOLIC BLOOD PRESSURE < 130 MM HG: ICD-10-PCS | Mod: CPTII,,, | Performed by: OBSTETRICS & GYNECOLOGY

## 2022-09-20 PROCEDURE — 99212 OFFICE O/P EST SF 10 MIN: CPT | Mod: PBBFAC

## 2022-09-20 PROCEDURE — 3078F PR MOST RECENT DIASTOLIC BLOOD PRESSURE < 80 MM HG: ICD-10-PCS | Mod: CPTII,,, | Performed by: OBSTETRICS & GYNECOLOGY

## 2022-09-20 PROCEDURE — 3078F DIAST BP <80 MM HG: CPT | Mod: CPTII,,, | Performed by: OBSTETRICS & GYNECOLOGY

## 2022-09-20 PROCEDURE — 99999 PR PBB SHADOW E&M-EST. PATIENT-LVL II: CPT | Mod: PBBFAC,,,

## 2022-09-20 PROCEDURE — 3074F SYST BP LT 130 MM HG: CPT | Mod: CPTII,,, | Performed by: OBSTETRICS & GYNECOLOGY

## 2022-09-20 PROCEDURE — 1159F PR MEDICATION LIST DOCUMENTED IN MEDICAL RECORD: ICD-10-PCS | Mod: CPTII,,, | Performed by: OBSTETRICS & GYNECOLOGY

## 2022-09-20 PROCEDURE — 1159F MED LIST DOCD IN RCRD: CPT | Mod: CPTII,,, | Performed by: OBSTETRICS & GYNECOLOGY

## 2022-09-20 PROCEDURE — 3008F PR BODY MASS INDEX (BMI) DOCUMENTED: ICD-10-PCS | Mod: CPTII,,, | Performed by: OBSTETRICS & GYNECOLOGY

## 2022-09-20 PROCEDURE — 99999 PR PBB SHADOW E&M-EST. PATIENT-LVL II: ICD-10-PCS | Mod: PBBFAC,,,

## 2022-09-20 PROCEDURE — 3008F BODY MASS INDEX DOCD: CPT | Mod: CPTII,,, | Performed by: OBSTETRICS & GYNECOLOGY

## 2022-09-20 PROCEDURE — 99024 POSTOP FOLLOW-UP VISIT: CPT | Mod: ,,, | Performed by: OBSTETRICS & GYNECOLOGY

## 2022-09-20 PROCEDURE — 99024 PR POST-OP FOLLOW-UP VISIT: ICD-10-PCS | Mod: ,,, | Performed by: OBSTETRICS & GYNECOLOGY

## 2022-09-20 RX ORDER — MUPIROCIN 20 MG/G
OINTMENT TOPICAL 3 TIMES DAILY
Qty: 30 G | Refills: 0 | Status: SHIPPED | OUTPATIENT
Start: 2022-09-20 | End: 2022-09-27

## 2022-09-20 RX ORDER — CLINDAMYCIN HYDROCHLORIDE 300 MG/1
300 CAPSULE ORAL EVERY 6 HOURS
Qty: 28 CAPSULE | Refills: 0 | Status: SHIPPED | OUTPATIENT
Start: 2022-09-20 | End: 2022-09-27

## 2022-09-20 NOTE — PROGRESS NOTES
Subjective:       Patient ID: Donna Kwong is a 31 y.o. female.    Chief Complaint:  Wound Check      History of Present Illness  HPI  Postoperative Follow-up  Patient presents to the clinic 2 weeks status post bilateral salpingectomy for  wound check. Patient reports issue with RLQ incision. Reports several days after procedure the skin glue fell of and incision has steadily worsened, slowly opened up and slight drainage. No fever/chills. . Eating a regular diet without difficulty. Bowel movements are normal. The patient is not having any pain.    GYN & OB History  No LMP recorded. Patient has had an ablation.   Date of Last Pap: 2020    OB History    Para Term  AB Living   4 3 3 0 1 3   SAB IAB Ectopic Multiple Live Births   1 0 0 0 3      # Outcome Date GA Lbr Prosper/2nd Weight Sex Delivery Anes PTL Lv   4 Term 17 39w6d / 00:12 3.67 kg (8 lb 1.5 oz) M Vag-Spont EPI N CONSTANTINO   3 2014     SAB      2 Term 13   2.722 kg (6 lb) F Vag-Spont   CONSTANTINO   1 Term 04/15/11   2.722 kg (6 lb) F Vag-Spont   CONSTANTINO       Review of Systems  Review of Systems   Constitutional:  Negative for activity change, chills, fatigue and fever.   Respiratory:  Negative for cough.    Cardiovascular:  Negative for chest pain and leg swelling.   Gastrointestinal:  Negative for abdominal pain, constipation, nausea and vomiting.   Genitourinary:  Negative for dysuria, frequency, hematuria, pelvic pain, urgency, vaginal bleeding and vaginal discharge.   Integumentary:  Negative for rash.         Objective:    Physical Exam:   Constitutional: She is oriented to person, place, and time. She appears well-developed and well-nourished. No distress.             Abdominal: Soft. She exhibits abdominal incision (LLQ incision healing well; RLQ skin dehisced, necrotic slough to wound bed, intensely erythematous edges with epibole, residual monocryl). She exhibits no distension. There is no abdominal tenderness.              Musculoskeletal: No edema.       Neurological: She is alert and oriented to person, place, and time.    Skin: Skin is warm and dry. She is not diaphoretic. No erythema.        Problem List Items Addressed This Visit    None  Visit Diagnoses       Encounter for postoperative wound check    -  Primary    Relevant Medications    mupirocin (BACTROBAN) 2 % ointment    clindamycin (CLEOCIN) 300 MG capsule            Superficial wound dehiscence of trocar site with superimposed infection, no not suspect abscess or spreading cellulitis but likely colonized with erythematous wound edges and slough to wound bed.  Clindamycin and bactroban  Silver nitrite used to cauterize areas of wound bed and remaining suture material removed  Discussed wound care with nonstick dressing  Notify us if worsening at any time or if not slowly improving

## 2022-10-20 ENCOUNTER — PATIENT MESSAGE (OUTPATIENT)
Dept: SURGERY | Facility: HOSPITAL | Age: 32
End: 2022-10-20
Payer: MEDICAID

## 2022-10-28 ENCOUNTER — HOSPITAL ENCOUNTER (EMERGENCY)
Facility: HOSPITAL | Age: 32
Discharge: HOME | End: 2022-10-28
Attending: EMERGENCY MEDICINE
Payer: MEDICAID

## 2022-10-28 VITALS
TEMPERATURE: 98 F | BODY MASS INDEX: 29.61 KG/M2 | DIASTOLIC BLOOD PRESSURE: 72 MMHG | HEART RATE: 106 BPM | SYSTOLIC BLOOD PRESSURE: 140 MMHG | WEIGHT: 206.38 LBS | OXYGEN SATURATION: 99 % | RESPIRATION RATE: 20 BRPM

## 2022-10-28 DIAGNOSIS — K52.9 COLITIS: Primary | ICD-10-CM

## 2022-10-28 LAB
ALBUMIN SERPL BCP-MCNC: 4.1 G/DL (ref 3.5–5.2)
ALP SERPL-CCNC: 47 U/L (ref 55–135)
ALT SERPL W/O P-5'-P-CCNC: 27 U/L (ref 10–44)
ANION GAP SERPL CALC-SCNC: 11 MMOL/L (ref 8–16)
AST SERPL-CCNC: 17 U/L (ref 10–40)
BACTERIA #/AREA URNS HPF: ABNORMAL /HPF
BASOPHILS # BLD AUTO: 0.02 K/UL (ref 0–0.2)
BASOPHILS NFR BLD: 0.2 % (ref 0–1.9)
BILIRUB SERPL-MCNC: 0.4 MG/DL (ref 0.1–1)
BILIRUB UR QL STRIP: NEGATIVE
BUN SERPL-MCNC: 12 MG/DL (ref 6–20)
CALCIUM SERPL-MCNC: 9.5 MG/DL (ref 8.7–10.5)
CHLORIDE SERPL-SCNC: 105 MMOL/L (ref 95–110)
CLARITY UR: ABNORMAL
CO2 SERPL-SCNC: 22 MMOL/L (ref 23–29)
COLOR UR: ABNORMAL
CREAT SERPL-MCNC: 0.7 MG/DL (ref 0.5–1.4)
DIFFERENTIAL METHOD: ABNORMAL
EOSINOPHIL # BLD AUTO: 0.2 K/UL (ref 0–0.5)
EOSINOPHIL NFR BLD: 1.8 % (ref 0–8)
ERYTHROCYTE [DISTWIDTH] IN BLOOD BY AUTOMATED COUNT: 11.5 % (ref 11.5–14.5)
EST. GFR  (NO RACE VARIABLE): >60 ML/MIN/1.73 M^2
GLUCOSE SERPL-MCNC: 100 MG/DL (ref 70–110)
GLUCOSE UR QL STRIP: NEGATIVE
HCT VFR BLD AUTO: 40 % (ref 37–48.5)
HGB BLD-MCNC: 14.2 G/DL (ref 12–16)
HGB UR QL STRIP: NEGATIVE
HYALINE CASTS #/AREA URNS LPF: 0 /LPF
IMM GRANULOCYTES # BLD AUTO: 0.04 K/UL (ref 0–0.04)
IMM GRANULOCYTES NFR BLD AUTO: 0.4 % (ref 0–0.5)
KETONES UR QL STRIP: ABNORMAL
LEUKOCYTE ESTERASE UR QL STRIP: ABNORMAL
LYMPHOCYTES # BLD AUTO: 1 K/UL (ref 1–4.8)
LYMPHOCYTES NFR BLD: 9.5 % (ref 18–48)
MCH RBC QN AUTO: 33.2 PG (ref 27–31)
MCHC RBC AUTO-ENTMCNC: 35.5 G/DL (ref 32–36)
MCV RBC AUTO: 94 FL (ref 82–98)
MICROSCOPIC COMMENT: ABNORMAL
MONOCYTES # BLD AUTO: 0.8 K/UL (ref 0.3–1)
MONOCYTES NFR BLD: 7.5 % (ref 4–15)
NEUTROPHILS # BLD AUTO: 8.8 K/UL (ref 1.8–7.7)
NEUTROPHILS NFR BLD: 80.6 % (ref 38–73)
NITRITE UR QL STRIP: NEGATIVE
NRBC BLD-RTO: 0 /100 WBC
PH UR STRIP: 6 [PH] (ref 5–8)
PLATELET # BLD AUTO: 169 K/UL (ref 150–450)
PMV BLD AUTO: 11 FL (ref 9.2–12.9)
POTASSIUM SERPL-SCNC: 3.6 MMOL/L (ref 3.5–5.1)
PROT SERPL-MCNC: 7.2 G/DL (ref 6–8.4)
PROT UR QL STRIP: ABNORMAL
RBC # BLD AUTO: 4.28 M/UL (ref 4–5.4)
RBC #/AREA URNS HPF: 6 /HPF (ref 0–4)
SODIUM SERPL-SCNC: 138 MMOL/L (ref 136–145)
SP GR UR STRIP: >1.03 (ref 1–1.03)
SQUAMOUS #/AREA URNS HPF: 70 /HPF
URN SPEC COLLECT METH UR: ABNORMAL
UROBILINOGEN UR STRIP-ACNC: NEGATIVE EU/DL
WBC # BLD AUTO: 10.84 K/UL (ref 3.9–12.7)
WBC #/AREA URNS HPF: 6 /HPF (ref 0–5)

## 2022-10-28 PROCEDURE — 99285 EMERGENCY DEPT VISIT HI MDM: CPT | Mod: 25

## 2022-10-28 PROCEDURE — 80053 COMPREHEN METABOLIC PANEL: CPT | Performed by: PHYSICIAN ASSISTANT

## 2022-10-28 PROCEDURE — 96374 THER/PROPH/DIAG INJ IV PUSH: CPT

## 2022-10-28 PROCEDURE — 96375 TX/PRO/DX INJ NEW DRUG ADDON: CPT

## 2022-10-28 PROCEDURE — 81000 URINALYSIS NONAUTO W/SCOPE: CPT | Performed by: PHYSICIAN ASSISTANT

## 2022-10-28 PROCEDURE — 96361 HYDRATE IV INFUSION ADD-ON: CPT

## 2022-10-28 PROCEDURE — 25000003 PHARM REV CODE 250: Performed by: EMERGENCY MEDICINE

## 2022-10-28 PROCEDURE — 85025 COMPLETE CBC W/AUTO DIFF WBC: CPT | Performed by: PHYSICIAN ASSISTANT

## 2022-10-28 PROCEDURE — 63600175 PHARM REV CODE 636 W HCPCS: Performed by: EMERGENCY MEDICINE

## 2022-10-28 RX ORDER — HYDROCODONE BITARTRATE AND ACETAMINOPHEN 10; 325 MG/1; MG/1
1 TABLET ORAL EVERY 6 HOURS PRN
Qty: 9 TABLET | Refills: 0 | Status: SHIPPED | OUTPATIENT
Start: 2022-10-28 | End: 2023-11-27

## 2022-10-28 RX ORDER — ONDANSETRON 4 MG/1
4 TABLET, FILM COATED ORAL EVERY 6 HOURS PRN
Qty: 12 TABLET | Refills: 0 | Status: SHIPPED | OUTPATIENT
Start: 2022-10-28 | End: 2023-11-27

## 2022-10-28 RX ORDER — CIPROFLOXACIN 500 MG/1
500 TABLET ORAL 2 TIMES DAILY
Qty: 14 TABLET | Refills: 0 | Status: SHIPPED | OUTPATIENT
Start: 2022-10-28 | End: 2022-11-04

## 2022-10-28 RX ORDER — DICYCLOMINE HYDROCHLORIDE 20 MG/1
20 TABLET ORAL
Status: COMPLETED | OUTPATIENT
Start: 2022-10-28 | End: 2022-10-28

## 2022-10-28 RX ORDER — KETOROLAC TROMETHAMINE 30 MG/ML
15 INJECTION, SOLUTION INTRAMUSCULAR; INTRAVENOUS
Status: COMPLETED | OUTPATIENT
Start: 2022-10-28 | End: 2022-10-28

## 2022-10-28 RX ORDER — METRONIDAZOLE 500 MG/1
500 TABLET ORAL 4 TIMES DAILY
Qty: 28 TABLET | Refills: 0 | Status: SHIPPED | OUTPATIENT
Start: 2022-10-28 | End: 2022-11-04

## 2022-10-28 RX ORDER — ONDANSETRON 2 MG/ML
4 INJECTION INTRAMUSCULAR; INTRAVENOUS
Status: COMPLETED | OUTPATIENT
Start: 2022-10-28 | End: 2022-10-28

## 2022-10-28 RX ADMIN — KETOROLAC TROMETHAMINE 15 MG: 30 INJECTION, SOLUTION INTRAMUSCULAR; INTRAVENOUS at 11:10

## 2022-10-28 RX ADMIN — DICYCLOMINE HYDROCHLORIDE 20 MG: 20 TABLET ORAL at 11:10

## 2022-10-28 RX ADMIN — ONDANSETRON 4 MG: 2 INJECTION INTRAMUSCULAR; INTRAVENOUS at 11:10

## 2022-10-28 RX ADMIN — SODIUM CHLORIDE 1000 ML: 0.9 INJECTION, SOLUTION INTRAVENOUS at 11:10

## 2022-10-28 NOTE — FIRST PROVIDER EVALUATION
Medical screening examination initiated.  I have conducted a focused provider triage encounter, findings are as follows:    Brief history of present illness:  diarrhea, lower abd cramping for 2 days.  Bilateral salpingectomy 2 weeks ago    Vitals:    10/28/22 0930   BP: (!) 140/72   BP Location: Right arm   Patient Position: Sitting   Pulse: 106   Resp: 20   Temp: 98.1 °F (36.7 °C)   TempSrc: Oral   SpO2: 99%   Weight: 93.6 kg (206 lb 5.6 oz)       Pertinent physical exam:  nad, alert        Preliminary workup initiated; this workup will be continued and followed by the physician or advanced practice provider that is assigned to the patient when roomed.

## 2022-10-28 NOTE — DISCHARGE INSTRUCTIONS
Cipro and Flagyl as prescribed for infection.  Do not drink alcohol while taking Flagyl is a will make you violently ill.  Zofran for nausea.  Norco for pain.  Follow-up with her doctor on Monday for re-evaluation.  Return as needed for any worsening symptoms, problems, questions or concerns.

## 2022-10-28 NOTE — ED PROVIDER NOTES
SCRIBE #1 NOTE: I, Jana White, am scribing for, and in the presence of, Ashish Acosta Jr., MD. I have scribed the entire note.       History     Chief Complaint   Patient presents with    Abdominal Pain     Pt states she recently had her fallopian tubes removed. Still has ovaries and uterus. Pt reports lower abdominal pain and nausea x 2 days that occurs every 20 minutes. Pt denies any painful urination or abnormal vaginal discharge      Review of patient's allergies indicates:  No Known Allergies      History of Present Illness     HPI    10/28/2022, 10:57 AM  History obtained from the patient      History of Present Illness: Donna Kwong is a 31 y.o. female patient with a PMHx of epilepsy, seizures, and tobacco use disorder who presents to the Emergency Department for evaluation of lower abd pain which onset 2 days PTA. Symptoms are constant and moderate in severity. Pt had a bilateral salpingectomy on 9/6. She still has ovaries and uterus. Pt started having abd pain about 2 days ago along with nausea. Pt states she has an internal hemorrhoid. Associated sxs include diarrhea. Patient denies any dysuria, vomiting, vaginal discharge, weakness, fatigue, and all other sxs at this time. No prior Tx reported. Pt has a Hx of kidney stones. No further complaints or concerns at this time.       Arrival mode: Personal vehicle      PCP: Marii Aguilar MD        Past Medical History:  Past Medical History:   Diagnosis Date    ADHD (attention deficit hyperactivity disorder)     Epilepsy 1/9/2013 10:21:01 AM    [facility] Historical - Neurologic: Seizure disorder;w/o mention intractable epilepsy-No Additional Notes    Seizures     last: 2018    Tobacco use disorder        Past Surgical History:  Past Surgical History:   Procedure Laterality Date    LAPAROSCOPIC SALPINGECTOMY Bilateral 9/6/2022    Procedure: SALPINGECTOMY, LAPAROSCOPIC;  Surgeon: Bhavna Roach MD;  Location: [facility] OR;  Service: OB/GYN;   Laterality: Bilateral;    REMOVAL OF IMPLANT Left 2022    Procedure: REMOVAL, IMPLANT;  Surgeon: Bhavna Roach MD;  Location: [facility];  Service: OB/GYN;  Laterality: Left;  Nexplanon implant removal    TONSILLECTOMY           Family History:  Family History   Problem Relation Age of Onset    Hypertension Father     Hepatitis Father         Hepatitis C    Hearing loss Other         no inner ear drum     Breast cancer Neg Hx     Colon cancer Neg Hx     Ovarian cancer Neg Hx        Social History:  Social History     Tobacco Use    Smoking status: Former     Packs/day: 0.25     Years: 6.00     Pack years: 1.50     Types: Vaping w/o nicotine, Cigarettes     Quit date: 2022     Years since quittin.2    Smokeless tobacco: Current     Last attempt to quit: 1/10/2017    Tobacco comments:     hold midnight prior to sx   Substance and Sexual Activity    Alcohol use: Yes     Comment: hold 72 hrs prior to sx    Drug use: No    Sexual activity: Yes     Partners: Male     Birth control/protection: Implant, None        Review of Systems     Review of Systems   Constitutional:  Negative for fatigue and fever.   HENT:  Negative for sore throat.    Respiratory:  Negative for shortness of breath.    Cardiovascular:  Negative for chest pain.   Gastrointestinal:  Positive for abdominal pain (lower), diarrhea and nausea. Negative for vomiting.   Genitourinary:  Negative for dysuria and vaginal discharge.   Musculoskeletal:  Negative for back pain.   Skin:  Negative for rash.   Neurological:  Negative for weakness.   Hematological:  Does not bruise/bleed easily.   All other systems reviewed and are negative.     Physical Exam     Initial Vitals [10/28/22 0930]   BP Pulse Resp Temp SpO2   (!) 140/72 106 20 98.1 °F (36.7 °C) 99 %      MAP       --          Physical Exam  Nursing Notes and Vital Signs Reviewed.  Constitutional: Patient is in no acute distress. Well-developed and well-nourished.  Head: Atraumatic.  Normocephalic.  Eyes:  EOM intact.  No scleral icterus.  ENT: Mucous membranes are moist.  Nares clear   Neck:  Full ROM. No JVD.  Cardiovascular: Regular rate. Regular rhythm No murmurs, rubs, or gallops. Distal pulses are 2+ and symmetric  Pulmonary/Chest: No respiratory distress. Clear to auscultation bilaterally. No wheezing or rales.  Equal chest wall rise bilaterally  Abdominal: Soft and non-distended.  Mild tenderness greater in the left upper quadrant and left lower quadrant.  No tenderness at McBurney's point.  Negative Toledo's.  Genitourinary: No CVA tenderness.  No suprapubic tenderness  Musculoskeletal: Moves all extremities. No obvious deformities.  5 x 5 strength in all extremities   Skin: Warm and dry.  Neurological:  Alert, awake, and appropriate.  Normal speech.  No acute focal neurological deficits are appreciated.  Two through 12 intact bilaterally.  Psychiatric: Normal affect. Good eye contact. Appropriate in content.      ED Course   Procedures  ED Vital Signs:  Vitals:    10/28/22 0930   BP: (!) 140/72   Pulse: 106   Resp: 20   Temp: 98.1 °F (36.7 °C)   TempSrc: Oral   SpO2: 99%   Weight: 93.6 kg (206 lb 5.6 oz)       Abnormal Lab Results:  Labs Reviewed   CBC W/ AUTO DIFFERENTIAL - Abnormal; Notable for the following components:       Result Value    MCH 33.2 (*)     Gran # (ANC) 8.8 (*)     Gran % 80.6 (*)     Lymph % 9.5 (*)     All other components within normal limits   COMPREHENSIVE METABOLIC PANEL - Abnormal; Notable for the following components:    CO2 22 (*)     Alkaline Phosphatase 47 (*)     All other components within normal limits   URINALYSIS, REFLEX TO URINE CULTURE - Abnormal; Notable for the following components:    Color, UA Orange (*)     Appearance, UA Cloudy (*)     Specific Gravity, UA >1.030 (*)     Protein, UA 1+ (*)     Ketones, UA 1+ (*)     Leukocytes, UA 3+ (*)     All other components within normal limits    Narrative:     Specimen Source->Urine   URINALYSIS  MICROSCOPIC - Abnormal; Notable for the following components:    RBC, UA 6 (*)     WBC, UA 6 (*)     Bacteria Many (*)     All other components within normal limits    Narrative:     Specimen Source->Urine   PREGNANCY TEST, URINE RAPID        All Lab Results:  Results for orders placed or performed during the hospital encounter of 10/28/22   CBC auto differential   Result Value Ref Range    WBC 10.84 3.90 - 12.70 K/uL    RBC 4.28 4.00 - 5.40 M/uL    Hemoglobin 14.2 12.0 - 16.0 g/dL    Hematocrit 40.0 37.0 - 48.5 %    MCV 94 82 - 98 fL    MCH 33.2 (H) 27.0 - 31.0 pg    MCHC 35.5 32.0 - 36.0 g/dL    RDW 11.5 11.5 - 14.5 %    Platelets 169 150 - 450 K/uL    MPV 11.0 9.2 - 12.9 fL    Immature Granulocytes 0.4 0.0 - 0.5 %    Gran # (ANC) 8.8 (H) 1.8 - 7.7 K/uL    Immature Grans (Abs) 0.04 0.00 - 0.04 K/uL    Lymph # 1.0 1.0 - 4.8 K/uL    Mono # 0.8 0.3 - 1.0 K/uL    Eos # 0.2 0.0 - 0.5 K/uL    Baso # 0.02 0.00 - 0.20 K/uL    nRBC 0 0 /100 WBC    Gran % 80.6 (H) 38.0 - 73.0 %    Lymph % 9.5 (L) 18.0 - 48.0 %    Mono % 7.5 4.0 - 15.0 %    Eosinophil % 1.8 0.0 - 8.0 %    Basophil % 0.2 0.0 - 1.9 %    Differential Method Automated    Comprehensive metabolic panel   Result Value Ref Range    Sodium 138 136 - 145 mmol/L    Potassium 3.6 3.5 - 5.1 mmol/L    Chloride 105 95 - 110 mmol/L    CO2 22 (L) 23 - 29 mmol/L    Glucose 100 70 - 110 mg/dL    BUN 12 6 - 20 mg/dL    Creatinine 0.7 0.5 - 1.4 mg/dL    Calcium 9.5 8.7 - 10.5 mg/dL    Total Protein 7.2 6.0 - 8.4 g/dL    Albumin 4.1 3.5 - 5.2 g/dL    Total Bilirubin 0.4 0.1 - 1.0 mg/dL    Alkaline Phosphatase 47 (L) 55 - 135 U/L    AST 17 10 - 40 U/L    ALT 27 10 - 44 U/L    Anion Gap 11 8 - 16 mmol/L    eGFR >60 >60 mL/min/1.73 m^2   Urinalysis, Reflex to Urine Culture Urine, Clean Catch    Specimen: Urine   Result Value Ref Range    Specimen UA Urine, Clean Catch     Color, UA Orange (A) Yellow, Straw, Portia    Appearance, UA Cloudy (A) Clear    pH, UA 6.0 5.0 - 8.0    Specific  Gravity, UA >1.030 (A) 1.005 - 1.030    Protein, UA 1+ (A) Negative    Glucose, UA Negative Negative    Ketones, UA 1+ (A) Negative    Bilirubin (UA) Negative Negative    Occult Blood UA Negative Negative    Nitrite, UA Negative Negative    Urobilinogen, UA Negative <2.0 EU/dL    Leukocytes, UA 3+ (A) Negative   Urinalysis Microscopic   Result Value Ref Range    RBC, UA 6 (H) 0 - 4 /hpf    WBC, UA 6 (H) 0 - 5 /hpf    Bacteria Many (A) None-Occ /hpf    Squam Epithel, UA 70 /hpf    Hyaline Casts, UA 0 0-1/lpf /lpf    Microscopic Comment SEE COMMENT         Imaging Results:  Imaging Results              CT Abdomen Pelvis  Without Contrast (Final result)  Result time 10/28/22 11:37:57      Final result by Asad Mcclelland MD (10/28/22 11:37:57)                   Impression:      Thickening and inflammatory changes seen throughout the colon consistent with infectious or inflammatory colitis.    Evaluation of solid organ and vascular pathology is limited due to lack of IV contrast.    All CT scans at this facility use dose modulation, iterative reconstruction, and/or weight based dosing when appropriate to reduce radiation dose to as low as reasonable achievable.      Electronically signed by: Asad Mcclelland MD  Date:    10/28/2022  Time:    11:37               Narrative:    EXAMINATION:  CT ABDOMEN PELVIS WITHOUT CONTRAST    CLINICAL HISTORY:  Abdominal abscess/infection suspected;    TECHNIQUE:  Low dose axial images, sagittal and coronal reformations were obtained from the lung bases to the pubic symphysis.  Oral contrast was not administered.    COMPARISON:  11/08/2015    FINDINGS:  Heart: Normal size. No effusion.    Lung Bases: Clear.    Liver: Normal size and attenuation. No focal lesions.    Gallbladder: No calcified gallstones.    Bile Ducts: No dilatation.    Pancreas: No obvious mass. No peripancreatic fat stranding.    Spleen: Normal.    Adrenals: Normal.    Kidneys/Ureters: No mass, hydroureteronephrosis, or  nephroureterolithiasis.    Bladder: No wall thickening.    Reproductive organs: Normal.    GI Tract/Mesentery: No evidence of bowel obstruction.  Thickening and inflammatory changes seen throughout the colon consistent with infectious or inflammatory colitis.  No evidence of appendicitis.  Shotty mesenteric nodes, likely reactive.    Peritoneal Space: No ascites or free air.    Retroperitoneum: No significant adenopathy.    Abdominal wall: Small fat containing umbilical hernia.    Vasculature: No aneurysm.    Bones: No acute fracture. No suspicious lytic or sclerotic lesions.                                                The Emergency Provider reviewed the vital signs and test results, which are outlined above.     ED Discussion     1:08 PM: Reassessed pt at this time. Discussed with pt all pertinent ED information and results. Discussed pt dx and plan of tx. Gave pt all f/u and return to the ED instructions. All questions and concerns were addressed at this time. Pt expresses understanding of information and instructions, and is comfortable with plan to discharge. Pt is stable for discharge.    I discussed with patient and/or family/caretaker that evaluation in the ED does not suggest any emergent or life threatening medical conditions requiring immediate intervention beyond what was provided in the ED, and I believe patient is safe for discharge.  Regardless, an unremarkable evaluation in the ED does not preclude the development or presence of a serious of life threatening condition. As such, patient was instructed to return immediately for any worsening or change in current symptoms.      Patient is stable nontoxic.  Patient has colitis causing her pain with nausea vomiting diarrhea.  Differential includes infectious versus inflammatory condition.  I will treat infectious with Cipro and Flagyl.  Given precautions around Flagyl.  Patient has follow-up with her primary care doctor or GI post treatment for  re-evaluation.  Patient verbalized agreement understanding with all instructions and seems reliable.  She is safe for discharge my opinion.    Medical Decision Making:   Clinical Tests:   Lab Tests: Ordered and Reviewed  Radiological Study: Ordered and Reviewed         ED Medication(s):  Medications   sodium chloride 0.9% bolus 1,000 mL (0 mLs Intravenous Stopped 10/28/22 1309)   ondansetron injection 4 mg (4 mg Intravenous Given 10/28/22 1141)   ketorolac injection 15 mg (15 mg Intravenous Given 10/28/22 1140)   dicyclomine tablet 20 mg (20 mg Oral Given 10/28/22 1140)       Discharge Medication List as of 10/28/2022  1:10 PM        START taking these medications    Details   ciprofloxacin HCl (CIPRO) 500 MG tablet Take 1 tablet (500 mg total) by mouth 2 (two) times daily. for 7 days, Starting Fri 10/28/2022, Until Fri 11/4/2022, Print      HYDROcodone-acetaminophen (NORCO)  mg per tablet Take 1 tablet by mouth every 6 (six) hours as needed for Pain., Starting Fri 10/28/2022, Print      metroNIDAZOLE (FLAGYL) 500 MG tablet Take 1 tablet (500 mg total) by mouth 4 (four) times daily. for 7 days, Starting Fri 10/28/2022, Until Fri 11/4/2022, Print      ondansetron (ZOFRAN) 4 MG tablet Take 1 tablet (4 mg total) by mouth every 6 (six) hours as needed for Nausea., Starting Fri 10/28/2022, Print              Follow-up Information       Marii Aguilar MD In 2 days.    Specialty: Family Medicine  Contact information:  [address] [place] [address] DR Rolanda EDMONDSON [id number] [phone]                                 Scribe Attestation:   Scribe #1: I performed the above scribed service and the documentation accurately describes the services I performed. I attest to the accuracy of the note.     Attending:   Physician Attestation Statement for Scribe #1: I, Ashish Acosta Jr., MD, personally performed the services described in this documentation, as scribed by Jana White, in my presence, and it is both accurate  and complete.           Clinical Impression       ICD-10-CM ICD-9-CM   1. Colitis  K52.9 558.9       Disposition:   Disposition: Discharged  Condition: Stable      Ashish Acosta Jr., MD  10/28/22 7636

## 2022-11-30 ENCOUNTER — OFFICE VISIT (OUTPATIENT)
Dept: PRIMARY CARE CLINIC | Facility: CLINIC | Age: 32
End: 2022-11-30
Payer: MEDICAID

## 2022-11-30 DIAGNOSIS — R19.7 DIARRHEA, UNSPECIFIED TYPE: Primary | ICD-10-CM

## 2022-11-30 DIAGNOSIS — K52.9 COLITIS: ICD-10-CM

## 2022-11-30 PROCEDURE — 99214 PR OFFICE/OUTPT VISIT, EST, LEVL IV, 30-39 MIN: ICD-10-PCS | Mod: 95,,, | Performed by: NURSE PRACTITIONER

## 2022-11-30 PROCEDURE — 99214 OFFICE O/P EST MOD 30 MIN: CPT | Mod: 95,,, | Performed by: NURSE PRACTITIONER

## 2022-11-30 NOTE — PROGRESS NOTES
Subjective:       Patient ID: Donna Kwong is a 32 y.o. female.    Chief Complaint: ER Visit for Colitis  The patient location is: [place], [place]  Visit type: audiovisual    Face to Face time with patient: 11 min  12 minutes of total time spent on the encounter, which includes face to face time and non-face to face time preparing to see the patient (eg, review of tests), Obtaining and/or reviewing separately obtained history, Documenting clinical information in the electronic or other health record, Independently interpreting results (not separately reported) and communicating results to the patient/family/caregiver, or Care coordination (not separately reported).         Each patient to whom he or she provides medical services by telemedicine is:  (1) informed of the relationship between the physician and patient and the respective role of any other health care provider with respect to management of the patient; and (2) notified that he or she may decline to receive medical services by telemedicine and may withdraw from such care at any time.    Notes:    History of Present Illness:   Donna Kwong 32 y.o. female presents today with reports of abdominal pain with diarrhea that has been present Since October 28, 2022. According to patient she completed her antibiotics including Flagyl. Denies any other problems or concerns at this time. Will obtain labs and stool sample for analysis. Denies any other problems or concerns at this time. Treatment options and alternatives were discussed with the patient. Patient provided opportunity to ask additional questions.  All questions were answered. Voices understanding and acceptance of this advice. Instructed to call back if any further questions or concerns.      Past Medical History:   Diagnosis Date    ADHD (attention deficit hyperactivity disorder)     Epilepsy 1/9/2013 10:21:01 AM    [facility] Historical - Neurologic: Seizure disorder;w/o mention intractable  epilepsy-No Additional Notes    Seizures     last:     Tobacco use disorder      Family History   Problem Relation Age of Onset    Hypertension Father     Hepatitis Father         Hepatitis C    Hearing loss Other         no inner ear drum     Breast cancer Neg Hx     Colon cancer Neg Hx     Ovarian cancer Neg Hx      Social History     Socioeconomic History    Marital status: Single   Tobacco Use    Smoking status: Former     Packs/day: 0.25     Years: 6.00     Pack years: 1.50     Types: Vaping w/o nicotine, Cigarettes     Quit date: 2022     Years since quittin.3    Smokeless tobacco: Current     Last attempt to quit: 1/10/2017    Tobacco comments:     hold midnight prior to sx   Substance and Sexual Activity    Alcohol use: Yes     Comment: hold 72 hrs prior to sx    Drug use: No    Sexual activity: Yes     Partners: Male     Birth control/protection: Implant, None     Social Determinants of Health     Financial Resource Strain: High Risk    Difficulty of Paying Living Expenses: Hard   Food Insecurity: Unknown    Worried About Running Out of Food in the Last Year: Patient refused    Ran Out of Food in the Last Year: Patient refused   Transportation Needs: No Transportation Needs    Lack of Transportation (Medical): No    Lack of Transportation (Non-Medical): No   Physical Activity: Insufficiently Active    Days of Exercise per Week: 2 days    Minutes of Exercise per Session: 60 min   Stress: Stress Concern Present    Feeling of Stress : Very much   Social Connections: Unknown    Frequency of Communication with Friends and Family: Once a week    Frequency of Social Gatherings with Friends and Family: Once a week    Active Member of Clubs or Organizations: No    Attends Club or Organization Meetings: More than 4 times per year    Marital Status: Patient refused   Housing Stability: High Risk    Unable to Pay for Housing in the Last Year: Yes    Number of Places Lived in the Last Year: 1    Unstable  Housing in the Last Year: No     Outpatient Encounter Medications as of 11/30/2022   Medication Sig Dispense Refill    HYDROcodone-acetaminophen (NORCO)  mg per tablet Take 1 tablet by mouth every 6 (six) hours as needed for Pain. 9 tablet 0    HYDROcodone-acetaminophen (NORCO) 5-325 mg per tablet Take 1 tablet by mouth every 6 to 8 hours as needed for Pain. (Patient not taking: Reported on 9/20/2022) 10 tablet 0    ondansetron (ZOFRAN) 4 MG tablet Take 1 tablet (4 mg total) by mouth every 6 (six) hours as needed for Nausea. 12 tablet 0     No facility-administered encounter medications on file as of 11/30/2022.       Review of Systems   Constitutional:  Negative for activity change and unexpected weight change.   HENT:  Negative for hearing loss, rhinorrhea and trouble swallowing.    Eyes:  Negative for discharge and visual disturbance.   Respiratory:  Negative for chest tightness and wheezing.    Cardiovascular:  Negative for chest pain and palpitations.   Gastrointestinal:  Positive for blood in stool and diarrhea. Negative for constipation and vomiting.   Endocrine: Negative for polydipsia and polyuria.   Genitourinary:  Negative for difficulty urinating, dysuria, hematuria and menstrual problem.   Musculoskeletal:  Negative for arthralgias, joint swelling and neck pain.   Neurological:  Negative for weakness and headaches.   Psychiatric/Behavioral:  Negative for confusion and dysphoric mood.      Objective:      There were no vitals taken for this visit.  Physical Exam  Constitutional:       Appearance: She is normal weight.   Neurological:      Mental Status: She is alert.       Results for orders placed or performed during the hospital encounter of 10/28/22   CBC auto differential   Result Value Ref Range    WBC 10.84 3.90 - 12.70 K/uL    RBC 4.28 4.00 - 5.40 M/uL    Hemoglobin 14.2 12.0 - 16.0 g/dL    Hematocrit 40.0 37.0 - 48.5 %    MCV 94 82 - 98 fL    MCH 33.2 (H) 27.0 - 31.0 pg    MCHC 35.5 32.0 -  36.0 g/dL    RDW 11.5 11.5 - 14.5 %    Platelets 169 150 - 450 K/uL    MPV 11.0 9.2 - 12.9 fL    Immature Granulocytes 0.4 0.0 - 0.5 %    Gran # (ANC) 8.8 (H) 1.8 - 7.7 K/uL    Immature Grans (Abs) 0.04 0.00 - 0.04 K/uL    Lymph # 1.0 1.0 - 4.8 K/uL    Mono # 0.8 0.3 - 1.0 K/uL    Eos # 0.2 0.0 - 0.5 K/uL    Baso # 0.02 0.00 - 0.20 K/uL    nRBC 0 0 /100 WBC    Gran % 80.6 (H) 38.0 - 73.0 %    Lymph % 9.5 (L) 18.0 - 48.0 %    Mono % 7.5 4.0 - 15.0 %    Eosinophil % 1.8 0.0 - 8.0 %    Basophil % 0.2 0.0 - 1.9 %    Differential Method Automated    Comprehensive metabolic panel   Result Value Ref Range    Sodium 138 136 - 145 mmol/L    Potassium 3.6 3.5 - 5.1 mmol/L    Chloride 105 95 - 110 mmol/L    CO2 22 (L) 23 - 29 mmol/L    Glucose 100 70 - 110 mg/dL    BUN 12 6 - 20 mg/dL    Creatinine 0.7 0.5 - 1.4 mg/dL    Calcium 9.5 8.7 - 10.5 mg/dL    Total Protein 7.2 6.0 - 8.4 g/dL    Albumin 4.1 3.5 - 5.2 g/dL    Total Bilirubin 0.4 0.1 - 1.0 mg/dL    Alkaline Phosphatase 47 (L) 55 - 135 U/L    AST 17 10 - 40 U/L    ALT 27 10 - 44 U/L    Anion Gap 11 8 - 16 mmol/L    eGFR >60 >60 mL/min/1.73 m^2   Urinalysis, Reflex to Urine Culture Urine, Clean Catch    Specimen: Urine   Result Value Ref Range    Specimen UA Urine, Clean Catch     Color, UA Orange (A) Yellow, Straw, Portia    Appearance, UA Cloudy (A) Clear    pH, UA 6.0 5.0 - 8.0    Specific Gravity, UA >1.030 (A) 1.005 - 1.030    Protein, UA 1+ (A) Negative    Glucose, UA Negative Negative    Ketones, UA 1+ (A) Negative    Bilirubin (UA) Negative Negative    Occult Blood UA Negative Negative    Nitrite, UA Negative Negative    Urobilinogen, UA Negative <2.0 EU/dL    Leukocytes, UA 3+ (A) Negative   Urinalysis Microscopic   Result Value Ref Range    RBC, UA 6 (H) 0 - 4 /hpf    WBC, UA 6 (H) 0 - 5 /hpf    Bacteria Many (A) None-Occ /hpf    Squam Epithel, UA 70 /hpf    Hyaline Casts, UA 0 0-1/lpf /lpf    Microscopic Comment SEE COMMENT      Assessment:       1. Diarrhea,  unspecified type    2. Colitis        Plan:   Diagnoses and all orders for this visit:    Diarrhea, unspecified type  -     Cancel: CLOSTRIDIUM DIFFICILE; Future  -     Cancel: Stool Exam-Ova,Cysts,Parasites; Future  -     Cancel: WBC, Stool; Future    Colitis           [facility]   [address] [place] [address] [address]  [place] [address]  Office [phone]  Fax [phone]

## 2023-11-27 ENCOUNTER — OFFICE VISIT (OUTPATIENT)
Dept: INTERNAL MEDICINE | Facility: CLINIC | Age: 33
End: 2023-11-27
Payer: MEDICAID

## 2023-11-27 ENCOUNTER — LAB VISIT (OUTPATIENT)
Dept: LAB | Facility: HOSPITAL | Age: 33
End: 2023-11-27
Attending: FAMILY MEDICINE
Payer: MEDICAID

## 2023-11-27 VITALS
DIASTOLIC BLOOD PRESSURE: 72 MMHG | BODY MASS INDEX: 29.16 KG/M2 | OXYGEN SATURATION: 98 % | TEMPERATURE: 97 F | SYSTOLIC BLOOD PRESSURE: 116 MMHG | HEIGHT: 70 IN | HEART RATE: 102 BPM | WEIGHT: 203.69 LBS | RESPIRATION RATE: 18 BRPM

## 2023-11-27 DIAGNOSIS — Z02.1 PHYSICAL EXAM, PRE-EMPLOYMENT: ICD-10-CM

## 2023-11-27 DIAGNOSIS — Z11.59 NEED FOR HEPATITIS C SCREENING TEST: ICD-10-CM

## 2023-11-27 DIAGNOSIS — Z02.1 PHYSICAL EXAM, PRE-EMPLOYMENT: Primary | ICD-10-CM

## 2023-11-27 DIAGNOSIS — Z23 NEED FOR VACCINATION: ICD-10-CM

## 2023-11-27 DIAGNOSIS — Z11.1 SCREENING-PULMONARY TB: ICD-10-CM

## 2023-11-27 PROCEDURE — 3008F BODY MASS INDEX DOCD: CPT | Mod: CPTII,,, | Performed by: FAMILY MEDICINE

## 2023-11-27 PROCEDURE — 3074F PR MOST RECENT SYSTOLIC BLOOD PRESSURE < 130 MM HG: ICD-10-PCS | Mod: CPTII,,, | Performed by: FAMILY MEDICINE

## 2023-11-27 PROCEDURE — 86735 MUMPS ANTIBODY: CPT | Performed by: FAMILY MEDICINE

## 2023-11-27 PROCEDURE — 99999 PR PBB SHADOW E&M-EST. PATIENT-LVL III: ICD-10-PCS | Mod: PBBFAC,,, | Performed by: FAMILY MEDICINE

## 2023-11-27 PROCEDURE — 99999PBSHW POCT TB SKIN TEST: ICD-10-PCS | Mod: PBBFAC,,,

## 2023-11-27 PROCEDURE — 86803 HEPATITIS C AB TEST: CPT | Performed by: FAMILY MEDICINE

## 2023-11-27 PROCEDURE — 86787 VARICELLA-ZOSTER ANTIBODY: CPT | Performed by: FAMILY MEDICINE

## 2023-11-27 PROCEDURE — 99214 PR OFFICE/OUTPT VISIT, EST, LEVL IV, 30-39 MIN: ICD-10-PCS | Mod: S$PBB,,, | Performed by: FAMILY MEDICINE

## 2023-11-27 PROCEDURE — 99214 OFFICE O/P EST MOD 30 MIN: CPT | Mod: S$PBB,,, | Performed by: FAMILY MEDICINE

## 2023-11-27 PROCEDURE — 86580 TB INTRADERMAL TEST: CPT | Mod: PBBFAC

## 2023-11-27 PROCEDURE — 3078F DIAST BP <80 MM HG: CPT | Mod: CPTII,,, | Performed by: FAMILY MEDICINE

## 2023-11-27 PROCEDURE — 86762 RUBELLA ANTIBODY: CPT | Performed by: FAMILY MEDICINE

## 2023-11-27 PROCEDURE — 3078F PR MOST RECENT DIASTOLIC BLOOD PRESSURE < 80 MM HG: ICD-10-PCS | Mod: CPTII,,, | Performed by: FAMILY MEDICINE

## 2023-11-27 PROCEDURE — 99213 OFFICE O/P EST LOW 20 MIN: CPT | Mod: PBBFAC | Performed by: FAMILY MEDICINE

## 2023-11-27 PROCEDURE — 99999PBSHW POCT TB SKIN TEST: Mod: PBBFAC,,,

## 2023-11-27 PROCEDURE — 86706 HEP B SURFACE ANTIBODY: CPT | Performed by: FAMILY MEDICINE

## 2023-11-27 PROCEDURE — 3074F SYST BP LT 130 MM HG: CPT | Mod: CPTII,,, | Performed by: FAMILY MEDICINE

## 2023-11-27 PROCEDURE — 99999PBSHW FLU VACCINE (QUAD) GREATER THAN OR EQUAL TO 3YO PRESERVATIVE FREE IM: Mod: PBBFAC,,,

## 2023-11-27 PROCEDURE — 3008F PR BODY MASS INDEX (BMI) DOCUMENTED: ICD-10-PCS | Mod: CPTII,,, | Performed by: FAMILY MEDICINE

## 2023-11-27 PROCEDURE — 90471 IMMUNIZATION ADMIN: CPT | Mod: PBBFAC

## 2023-11-27 PROCEDURE — 99999 PR PBB SHADOW E&M-EST. PATIENT-LVL III: CPT | Mod: PBBFAC,,, | Performed by: FAMILY MEDICINE

## 2023-11-27 PROCEDURE — 36415 COLL VENOUS BLD VENIPUNCTURE: CPT | Performed by: FAMILY MEDICINE

## 2023-11-27 PROCEDURE — 86765 RUBEOLA ANTIBODY: CPT | Performed by: FAMILY MEDICINE

## 2023-11-27 NOTE — PROGRESS NOTES
Subjective:       Patient ID: Donna Kwong is a 33 y.o. female.    Chief Complaint: Employment Physical    Patient presents to clinic today for employment physical exam.        Review of Systems   Constitutional:  Negative for chills, fatigue, fever and unexpected weight change.   HENT:  Negative for congestion, dental problem, ear pain, hearing loss, rhinorrhea and trouble swallowing.    Eyes:  Negative for pain and visual disturbance.   Respiratory:  Negative for cough and shortness of breath.    Cardiovascular:  Negative for chest pain, palpitations and leg swelling.   Gastrointestinal:  Negative for abdominal distention, abdominal pain, blood in stool, constipation, diarrhea, nausea and vomiting.   Genitourinary:  Negative for difficulty urinating and vaginal discharge.   Musculoskeletal:  Negative for arthralgias and myalgias.   Skin:  Negative for rash.   Neurological:  Negative for dizziness, weakness, numbness and headaches.   Hematological:  Negative for adenopathy. Does not bruise/bleed easily.   Psychiatric/Behavioral:  Negative for dysphoric mood and sleep disturbance. The patient is not nervous/anxious.          Objective:      Physical Exam  Vitals reviewed.   Constitutional:       General: She is not in acute distress.     Appearance: Normal appearance. She is well-developed.   HENT:      Head: Normocephalic and atraumatic.      Right Ear: Tympanic membrane, ear canal and external ear normal.      Left Ear: Tympanic membrane, ear canal and external ear normal.      Nose: Nose normal. No mucosal edema or rhinorrhea.      Mouth/Throat:      Pharynx: Uvula midline.   Eyes:      General: Lids are normal. No scleral icterus.     Extraocular Movements: Extraocular movements intact.      Conjunctiva/sclera: Conjunctivae normal.      Pupils: Pupils are equal, round, and reactive to light.   Neck:      Thyroid: No thyromegaly.   Cardiovascular:      Rate and Rhythm: Normal rate and regular rhythm.       Heart sounds: No murmur heard.     No friction rub. No gallop.   Pulmonary:      Effort: Pulmonary effort is normal.      Breath sounds: Normal breath sounds. No wheezing, rhonchi or rales.   Abdominal:      General: Bowel sounds are normal. There is no distension.      Palpations: Abdomen is soft. There is no mass.      Tenderness: There is no abdominal tenderness.   Musculoskeletal:         General: Normal range of motion.      Cervical back: Normal range of motion and neck supple.   Lymphadenopathy:      Cervical: No cervical adenopathy.   Skin:     General: Skin is warm and dry.      Findings: No lesion or rash.      Nails: There is no clubbing.   Neurological:      Mental Status: She is alert and oriented to person, place, and time.      Cranial Nerves: No cranial nerve deficit.      Sensory: No sensory deficit.      Gait: Gait normal.   Psychiatric:         Mood and Affect: Mood and affect normal.         Assessment:       1. Physical exam, pre-employment    2. Need for vaccination    3. Screening-pulmonary TB    4. Need for hepatitis C screening test        Plan:   1. Physical exam, pre-employment  -     Hepatitis B Surface Ab, Qualitative; Future; Expected date: 11/27/2023  -     Rubeola Antibody IgG; Future; Expected date: 11/27/2023  -     Rubella Antibody, IgG; Future; Expected date: 11/27/2023  -     Mumps, IgG Screen; Future; Expected date: 11/27/2023  -     Varicella Zoster Antibody, IgG; Future; Expected date: 11/27/2023    2. Need for vaccination  -     Influenza - Quadrivalent (PF)    3. Screening-pulmonary TB  -     POCT TB Skin Test    4. Need for hepatitis C screening test  -     Hepatitis C Antibody; Future; Expected date: 11/27/2023      Will complete paperwork once above results.  Health Maintenance reviewed/updated. Will address other HM at future visits.

## 2023-11-28 LAB
HBV SURFACE AB SER-ACNC: <3 MIU/ML
HBV SURFACE AB SER-ACNC: NORMAL M[IU]/ML
HCV AB SERPL QL IA: NORMAL
MUMPS IGG INTERPRETATION: NEGATIVE
MUMPS IGG SCREEN: <5 AU/ML
RUBEOLA IGG ANTIBODY: 29.2 AU/ML
RUBEOLA INTERPRETATION: POSITIVE
RUBV IGG SER-ACNC: 13.8 IU/ML
RUBV IGG SER-IMP: REACTIVE
VARICELLA INTERPRETATION: NEGATIVE
VARICELLA ZOSTER IGG: 104.4 AU/ML

## 2023-11-30 ENCOUNTER — CLINICAL SUPPORT (OUTPATIENT)
Dept: INTERNAL MEDICINE | Facility: CLINIC | Age: 33
End: 2023-11-30
Payer: MEDICAID

## 2023-11-30 VITALS — HEART RATE: 94 BPM

## 2023-11-30 DIAGNOSIS — Z23 NEED FOR VARICELLA VACCINE: ICD-10-CM

## 2023-11-30 DIAGNOSIS — Z23 NEED FOR MUMPS VACCINATION: ICD-10-CM

## 2023-11-30 DIAGNOSIS — Z23 NEED FOR HEPATITIS B VACCINATION: Primary | ICD-10-CM

## 2023-11-30 DIAGNOSIS — Z11.1 ENCOUNTER FOR PPD SKIN TEST READING: Primary | ICD-10-CM

## 2023-11-30 LAB
TB INDURATION - 48 HR READ: NORMAL
TB INDURATION - 72 HR READ: 0 MM
TB SKIN TEST - 48 HR READ: NORMAL
TB SKIN TEST - 72 HR READ: NEGATIVE

## 2023-11-30 PROCEDURE — 99999PBSHW VARICELLA VACCINE SQ: Mod: PBBFAC,,,

## 2023-11-30 PROCEDURE — 90716 VAR VACCINE LIVE SUBQ: CPT | Mod: PBBFAC

## 2023-11-30 PROCEDURE — 99999PBSHW HEPATITIS B (RECOMBINANT) ADJUVANTED, 2 DOSE: Mod: PBBFAC,,,

## 2023-11-30 PROCEDURE — 90471 IMMUNIZATION ADMIN: CPT | Mod: PBBFAC

## 2023-11-30 PROCEDURE — 99999 PR PBB SHADOW E&M-EST. PATIENT-LVL II: ICD-10-PCS | Mod: PBBFAC,,,

## 2023-11-30 PROCEDURE — 99999PBSHW MMR VACCINE SQ: Mod: PBBFAC,,,

## 2023-11-30 PROCEDURE — 90739 HEPB VACC 2/4 DOSE ADULT IM: CPT | Mod: PBBFAC

## 2023-11-30 PROCEDURE — 90707 MMR VACCINE SC: CPT | Mod: PBBFAC

## 2023-11-30 PROCEDURE — 99999PBSHW MMR VACCINE SQ: ICD-10-PCS | Mod: PBBFAC,,,

## 2023-11-30 PROCEDURE — 99999 PR PBB SHADOW E&M-EST. PATIENT-LVL II: CPT | Mod: PBBFAC,,,

## 2023-11-30 PROCEDURE — 99212 OFFICE O/P EST SF 10 MIN: CPT | Mod: PBBFAC

## 2023-11-30 NOTE — PROGRESS NOTES
Pt here for PPD reading, 0mm duration negative.     Completed eye exam for her physical form. Rechecked HR - 94. Pt notified to continue medications as prescribed and f/u as scheduled.

## (undated) DEVICE — SUT CTD VICRYL 0 UND BR SUT

## (undated) DEVICE — NDL PNEUMO INSUFFLATI 120MM

## (undated) DEVICE — INJECTOR UTERINE ZUMI 4.0

## (undated) DEVICE — IRRIGATOR ENDOSCOPY DISP.

## (undated) DEVICE — COVER PROXIMA MAYO STAND

## (undated) DEVICE — TRAY SKIN SCRUB WET PREMIUM

## (undated) DEVICE — SUT CTD VICRYL PLUS 4/0

## (undated) DEVICE — UNDERGLOVE BIOGEL PI SZ 6.5 LF

## (undated) DEVICE — KIT ANTIFOG W/SPONG & FLUID

## (undated) DEVICE — APPLICATOR CHLORAPREP ORN 26ML

## (undated) DEVICE — DRAPE UINDERBUT GRAD PCH

## (undated) DEVICE — TRAY CATH FOL SIL DRN BAG 16FR

## (undated) DEVICE — ELECTRODE REM PLYHSV RETURN 9

## (undated) DEVICE — PACK BASIC SETUP SC BR

## (undated) DEVICE — TROCAR ENDOPATH XCEL 8MM 10CM

## (undated) DEVICE — GOWN POLY REINF BRTH SLV XL

## (undated) DEVICE — SYR 3CC LUER LOC

## (undated) DEVICE — ADHESIVE DERMABOND ADVANCED

## (undated) DEVICE — BAG TISS RETRV MONARCH 10MM

## (undated) DEVICE — GLOVE SURGICAL LATEX SZ 6

## (undated) DEVICE — TROCAR ENDOPATH XCEL 5X100MM

## (undated) DEVICE — MANIFOLD 4 PORT

## (undated) DEVICE — DRAPE LAVH SURG 109X109X100IN

## (undated) DEVICE — CONTAINER SPECIMEN OR STER 4OZ

## (undated) DEVICE — SHEARS HARMONIC 5CM 36CM